# Patient Record
Sex: MALE | Race: WHITE | Employment: FULL TIME | URBAN - METROPOLITAN AREA
[De-identification: names, ages, dates, MRNs, and addresses within clinical notes are randomized per-mention and may not be internally consistent; named-entity substitution may affect disease eponyms.]

---

## 2019-02-17 ENCOUNTER — HOSPITAL ENCOUNTER (OUTPATIENT)
Age: 64
Discharge: LEFT BEFORE DISCHARGE | End: 2019-02-17
Payer: COMMERCIAL

## 2019-02-20 ENCOUNTER — OFFICE VISIT (OUTPATIENT)
Dept: FAMILY MEDICINE CLINIC | Facility: CLINIC | Age: 64
End: 2019-02-20
Payer: COMMERCIAL

## 2019-02-20 ENCOUNTER — HOSPITAL ENCOUNTER (OUTPATIENT)
Dept: GENERAL RADIOLOGY | Age: 64
Discharge: HOME | End: 2019-02-20
Attending: NURSE PRACTITIONER
Payer: COMMERCIAL

## 2019-02-20 VITALS
HEART RATE: 60 BPM | HEIGHT: 66 IN | DIASTOLIC BLOOD PRESSURE: 76 MMHG | WEIGHT: 130 LBS | BODY MASS INDEX: 20.89 KG/M2 | SYSTOLIC BLOOD PRESSURE: 148 MMHG

## 2019-02-20 DIAGNOSIS — M54.9 CHRONIC NECK AND BACK PAIN: Primary | ICD-10-CM

## 2019-02-20 DIAGNOSIS — M54.2 CHRONIC NECK AND BACK PAIN: ICD-10-CM

## 2019-02-20 DIAGNOSIS — G89.29 CHRONIC NECK AND BACK PAIN: Primary | ICD-10-CM

## 2019-02-20 DIAGNOSIS — G89.29 CHRONIC NECK AND BACK PAIN: ICD-10-CM

## 2019-02-20 DIAGNOSIS — M54.9 CHRONIC NECK AND BACK PAIN: ICD-10-CM

## 2019-02-20 DIAGNOSIS — M54.2 CHRONIC NECK AND BACK PAIN: Primary | ICD-10-CM

## 2019-02-20 DIAGNOSIS — Z72.0 TOBACCO USE: ICD-10-CM

## 2019-02-20 PROCEDURE — 72052 X-RAY EXAM NECK SPINE 6/>VWS: CPT | Performed by: NURSE PRACTITIONER

## 2019-02-20 PROCEDURE — 99204 OFFICE O/P NEW MOD 45 MIN: CPT | Performed by: NURSE PRACTITIONER

## 2019-02-20 PROCEDURE — 72114 X-RAY EXAM L-S SPINE BENDING: CPT | Performed by: NURSE PRACTITIONER

## 2019-02-20 RX ORDER — COVID-19 ANTIGEN TEST
220 KIT MISCELLANEOUS
COMMUNITY

## 2019-02-20 NOTE — PROGRESS NOTES
HPI  Pt here for evaluation of low back pain with sciatica. Has had low back issues for > 20 years. For past 2-3 weeks has had increase in pain, decreased mobility. No known injury to worsen pain-woke up in pain.  Thinks it may be related to uri that made Social History    Socioeconomic History      Marital status: Single      Spouse name: Not on file      Number of children: Not on file      Years of education: Not on file      Highest education level: Not on file    Occupational History      Not on file Cervical back: He exhibits bony tenderness. He exhibits normal range of motion and no tenderness. Lumbar back: He exhibits decreased range of motion, bony tenderness and spasm.         Back:    Neurological: He is alert and oriented to person, p

## 2019-02-21 PROBLEM — Z72.0 TOBACCO USE: Status: ACTIVE | Noted: 2019-02-21

## 2019-02-21 NOTE — ASSESSMENT & PLAN NOTE
cervical and lumbar spine xrays   con't otc ibuprofen 200 mg 2-3 tabs q 6-8 hrs prn  Ice to low back and neck 20 min 4-6 times per day  Refer physiatry  Additional referrals pending xray results.

## 2019-03-19 ENCOUNTER — OFFICE VISIT (OUTPATIENT)
Dept: NEUROLOGY | Facility: CLINIC | Age: 64
End: 2019-03-19
Payer: COMMERCIAL

## 2019-03-19 ENCOUNTER — TELEPHONE (OUTPATIENT)
Dept: FAMILY MEDICINE CLINIC | Facility: CLINIC | Age: 64
End: 2019-03-19

## 2019-03-19 ENCOUNTER — TELEPHONE (OUTPATIENT)
Dept: NEUROLOGY | Facility: CLINIC | Age: 64
End: 2019-03-19

## 2019-03-19 ENCOUNTER — HOSPITAL ENCOUNTER (OUTPATIENT)
Dept: GENERAL RADIOLOGY | Age: 64
Discharge: HOME | End: 2019-03-19
Attending: PHYSICAL MEDICINE & REHABILITATION
Payer: COMMERCIAL

## 2019-03-19 VITALS — RESPIRATION RATE: 16 BRPM | DIASTOLIC BLOOD PRESSURE: 82 MMHG | HEART RATE: 72 BPM | SYSTOLIC BLOOD PRESSURE: 146 MMHG

## 2019-03-19 DIAGNOSIS — M54.16 LUMBAR RADICULOPATHY: Primary | ICD-10-CM

## 2019-03-19 DIAGNOSIS — G89.29 CHRONIC BILATERAL LOW BACK PAIN WITH LEFT-SIDED SCIATICA: ICD-10-CM

## 2019-03-19 DIAGNOSIS — M43.17 SPONDYLOLISTHESIS AT L5-S1 LEVEL: ICD-10-CM

## 2019-03-19 DIAGNOSIS — M43.07 SPONDYLOLYSIS OF LUMBOSACRAL REGION: ICD-10-CM

## 2019-03-19 DIAGNOSIS — M54.42 CHRONIC BILATERAL LOW BACK PAIN WITH LEFT-SIDED SCIATICA: ICD-10-CM

## 2019-03-19 PROCEDURE — 99244 OFF/OP CNSLTJ NEW/EST MOD 40: CPT | Performed by: PHYSICAL MEDICINE & REHABILITATION

## 2019-03-19 PROCEDURE — 72110 X-RAY EXAM L-2 SPINE 4/>VWS: CPT | Performed by: PHYSICAL MEDICINE & REHABILITATION

## 2019-03-19 NOTE — TELEPHONE ENCOUNTER
Yissel for authorization of approval for MRI L-spine wo cpt code 10472. Tracking # [id number]  Will call [facility] Help to add [facility] as facility. [address] [facility].  T/t Padmini TINEO who transferred call to [place] [address] for facility

## 2019-03-19 NOTE — PATIENT INSTRUCTIONS
1) Get X-rays today on your way out  2) My office will call you when the MRI is approved by insurance. Then you will call to schedule the MRI.  Once you have a date for the MRI, call my office ([phone]) to schedule an appointment with me 2-3 days after

## 2019-03-19 NOTE — TELEPHONE ENCOUNTER
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  Patient is here today for a 1pm appointment with Nasim Denson, was wondering if letter could be ready on his way out, please advise

## 2019-03-19 NOTE — H&P
[address] H&P    Requesting Physician: No primary care provider on file.     Chief Complaint (Reason for Visit):  Patient presents with:  Neck Pain: Pt states that he has pain in the neck and its juancarlos Systems   Constitutional: Negative. HENT: Negative. Eyes: Negative. Respiratory: Negative. Cardiovascular: Negative. Gastrointestinal: Negative. Genitourinary: Negative. Musculoskeletal: As per HPI   Skin: Negative.     Neurological: As the lower extremities symmetrically  Reflexes: 2/4 at L4 and S1 except for 1 out of 4 at left L4 and S1  Facet Loading: Pain during extension and rotation to the right over the right low back.   Decreased pain during extension and rotation to the left over Dictated by (CST): Naheed Varma MD on 3/19/2019 at 15:45       Approved by (CST):  Naheed Varma MD on 3/19/2019 at 15:49              ASSESSMENT AND PLAN:  The patient is a 29-year-old male who is coming in complaining of low back pain which rad

## 2019-03-19 NOTE — H&P
[address] H&P    Requesting Physician: No primary care provider on file.     Chief Complaint (Reason for Visit):  Patient presents with:  Neck Pain: Pt states that he has pain in the neck and its juancarlos distress  Head: Normocephalic/ Atraumatic  Eyes: Extra-occular movements intact. Ears: No auricular hematoma or deformities  Mouth: No lesions or ulcerations  Heart: peripheral pulses intact. Normal capillary refill.    Lungs: Non-labored respirations  Ab {Imaging Body Part:6026} from ***  XR LUMBAR SPINE COMPLETE W/FLEX + EXT (CPT=72114)  Narrative: PROCEDURE: XR LUMBAR SPINE COMPLETE W/FLEX + EXT (CPT=72114)     COMPARISON: None. INDICATIONS: Chronic lower back pain with limited range of motion.      Delores Kelly TECHNIQUE: Cervical spine radiographs (7 views)     Counting reference:  Craniocervical junction. FINDINGS:      ALIGNMENT: Straightening of the normal cervical lordosis. ATLANTOAXIAL: Normal odontoid and atlantoaxial joints.     VERTEBRAL BODIES: [place]

## 2019-03-20 ENCOUNTER — TELEPHONE (OUTPATIENT)
Dept: NEUROLOGY | Facility: CLINIC | Age: 64
End: 2019-03-20

## 2019-03-20 NOTE — TELEPHONE ENCOUNTER
Pt calling to let Dr. Trudy Griffin know that while he was taking his x-rays yesterday, his shoulder came out of place while the imaging was being taken and he left there in more pain then when he arrived, he rated his pain at about a 8/9

## 2019-03-21 NOTE — TELEPHONE ENCOUNTER
S/w pt stating when they put the pt in position for XR, his bone in L5 popped in/out of place, causing increased pain. Pain was 405/10, is now 7-9/10. Pt states \"it's the fourth picture that shoes the injury\".      Pt plans to schedule f/u when we call hi

## 2019-03-22 NOTE — TELEPHONE ENCOUNTER
S/w patient and relayed XR results per Dr. Mike Tsang in previous note. Pt states he will move forward w/ scheduling the MRI and will call our office to schedule a f/u after he has that scheduled per Dr. Swathi Lee direction.

## 2019-03-22 NOTE — TELEPHONE ENCOUNTER
Attempted to call pt, unable to leave .  Pt requests return calls after 4pm. Pt is to be informed of Dr. Tamela Chase response below (Dr. Philomena Rutledge attempted to call pt with message below as well):    His XR demonstrate L4-S1 disc degeneration, osteoarthritis, and

## 2019-03-22 NOTE — TELEPHONE ENCOUNTER
[employer] to check on status of authorization for mri l-spine. Joan Islas Approved with Authorization # [id number] effective 03/20/19 to 04/19/19. Will call Pt. To inform. Maggie informed of approval. He will call to schedule appt.

## 2019-04-03 ENCOUNTER — HOSPITAL ENCOUNTER (OUTPATIENT)
Dept: MRI IMAGING | Age: 64
Discharge: HOME | End: 2019-04-03
Attending: PHYSICAL MEDICINE & REHABILITATION
Payer: COMMERCIAL

## 2019-04-03 DIAGNOSIS — M54.16 LUMBAR RADICULOPATHY: ICD-10-CM

## 2019-04-03 PROCEDURE — 72148 MRI LUMBAR SPINE W/O DYE: CPT | Performed by: PHYSICAL MEDICINE & REHABILITATION

## 2019-04-09 ENCOUNTER — TELEPHONE (OUTPATIENT)
Dept: FAMILY MEDICINE CLINIC | Facility: CLINIC | Age: 64
End: 2019-04-09

## 2019-04-09 DIAGNOSIS — M54.2 CHRONIC NECK AND BACK PAIN: Primary | ICD-10-CM

## 2019-04-09 DIAGNOSIS — M54.9 CHRONIC NECK AND BACK PAIN: Primary | ICD-10-CM

## 2019-04-09 DIAGNOSIS — G89.29 CHRONIC NECK AND BACK PAIN: Primary | ICD-10-CM

## 2019-04-09 NOTE — TELEPHONE ENCOUNTER
Patient requesting a referral to see dr Dalton Bryan for follow up office visit as soon as possible to go over mri. Please sign off on referral.    Informed patient referral process 3-5 business days.     Thanks,    Mustapha Covarrubias

## 2019-07-16 ENCOUNTER — OFFICE VISIT (OUTPATIENT)
Dept: NEUROLOGY | Facility: CLINIC | Age: 64
End: 2019-07-16
Payer: COMMERCIAL

## 2019-07-16 VITALS — SYSTOLIC BLOOD PRESSURE: 140 MMHG | RESPIRATION RATE: 17 BRPM | DIASTOLIC BLOOD PRESSURE: 72 MMHG | HEART RATE: 76 BPM

## 2019-07-16 DIAGNOSIS — M54.41 CHRONIC BILATERAL LOW BACK PAIN WITH BILATERAL SCIATICA: ICD-10-CM

## 2019-07-16 DIAGNOSIS — G89.29 CHRONIC BILATERAL LOW BACK PAIN WITH BILATERAL SCIATICA: ICD-10-CM

## 2019-07-16 DIAGNOSIS — M54.42 CHRONIC BILATERAL LOW BACK PAIN WITH BILATERAL SCIATICA: ICD-10-CM

## 2019-07-16 DIAGNOSIS — M51.26 BULGE OF LUMBAR DISC WITHOUT MYELOPATHY: ICD-10-CM

## 2019-07-16 DIAGNOSIS — M48.061 LUMBAR FORAMINAL STENOSIS: Primary | ICD-10-CM

## 2019-07-16 DIAGNOSIS — M47.899 FACET SYNDROME: ICD-10-CM

## 2019-07-16 DIAGNOSIS — M54.16 LUMBAR RADICULOPATHY: ICD-10-CM

## 2019-07-16 PROBLEM — M51.369 BULGE OF LUMBAR DISC WITHOUT MYELOPATHY: Status: ACTIVE | Noted: 2019-07-16

## 2019-07-16 PROBLEM — M51.36 BULGE OF LUMBAR DISC WITHOUT MYELOPATHY: Status: ACTIVE | Noted: 2019-07-16

## 2019-07-16 PROCEDURE — 99215 OFFICE O/P EST HI 40 MIN: CPT | Performed by: PHYSICAL MEDICINE & REHABILITATION

## 2019-07-16 RX ORDER — NAPROXEN 500 MG/1
500 TABLET ORAL 2 TIMES DAILY WITH MEALS
Qty: 60 TABLET | Refills: 0 | Status: SHIPPED | OUTPATIENT
Start: 2019-07-16

## 2019-07-16 NOTE — PROGRESS NOTES
[address]  Progress Note    CHIEF COMPLAINT:  Patient presents with:  Low Back Pain: LOV 03/19/19 Pt is present for a MRI follow up. he states that the pain changes all the time       History of Present Ill Naproxen Sodium (ALEVE) 220 MG Oral Cap Take 220 mg by mouth. Disp:  Rfl:        ALLERGIES:   No Known Allergies    REVIEW OF SYSTEMS:   Review of Systems   Constitutional: Negative. HENT: Negative. Eyes: Negative. Respiratory: Negative.     Card LEFT, great toe extension (L5) on the RIGHT, and plantar flexion (S1) on the left.   Sensation: Intact to light touch in all dermatomes of the lower extremities symmetrically  Reflexes: 2/4 at L4 and S1 except for 1 out of 4 at left L4 and S1  Facet Loading DISC LEVELS:  L1-L2: Disk desiccation with bulging disk, facet, and ligamentous hypertrophy results in mild central canal narrowing and mild bilateral neural foraminal narrowing.   L2-L3: Disk desiccation with bulging disk, facet, and ligamentous hypertroph although he is not sure which 1 of these he had in the early 1980s.   He states he has never had a caudal epidural injection and feels that that might be beneficial.  It is clinically indicated as the patient has bilateral moderate foraminal stenosis as wel

## 2019-07-16 NOTE — PATIENT INSTRUCTIONS
1) My office will call you to schedule the caudal epidural steroid injection under local anesthesia once the procedure is approved by your insurance carrier.     2) My office will call you 2 days after the procedure  3) Follow up with me 2 weeks after the i

## 2019-08-01 ENCOUNTER — TELEPHONE (OUTPATIENT)
Dept: NEUROLOGY | Facility: CLINIC | Age: 64
End: 2019-08-01

## 2019-08-01 NOTE — TELEPHONE ENCOUNTER
[facility] for authorization of approval for Caudal epidural steroid injection cpt code 62335. Approved with   Authorization # [id number] for one unit/DOS effective 0802/19 to 09/16/19. Will inform Nursing.

## 2019-08-12 ENCOUNTER — TELEPHONE (OUTPATIENT)
Dept: NEUROLOGY | Facility: CLINIC | Age: 64
End: 2019-08-12

## 2019-08-13 NOTE — TELEPHONE ENCOUNTER
Spoke to patient, states he has L5-S1 that pops out causing severe pain to back radiating down leg for about 30 seconds. This has been happening intermittently for last 20 years. C/o pain being so severe caused head and neck pain for 30 seconds also.  Occur

## 2019-08-13 NOTE — TELEPHONE ENCOUNTER
I agree, symptoms are chronic and do not seem emergent. The scheduled DAVID is reasonable. ER if needed is appropriate as we we have a non-emergent treatment plan in place.      If he would like the injection done sooner we can offer to put him on my schedule

## 2019-08-19 ENCOUNTER — HOSPITAL ENCOUNTER (EMERGENCY)
Facility: HOSPITAL | Age: 64
Discharge: HOME | End: 2019-08-19
Attending: EMERGENCY MEDICINE
Payer: COMMERCIAL

## 2019-08-19 VITALS
OXYGEN SATURATION: 98 % | BODY MASS INDEX: 18.05 KG/M2 | TEMPERATURE: 98 F | DIASTOLIC BLOOD PRESSURE: 121 MMHG | HEART RATE: 80 BPM | WEIGHT: 115 LBS | SYSTOLIC BLOOD PRESSURE: 147 MMHG | HEIGHT: 67 IN | RESPIRATION RATE: 17 BRPM

## 2019-08-19 DIAGNOSIS — M54.50 ACUTE EXACERBATION OF CHRONIC LOW BACK PAIN: Primary | ICD-10-CM

## 2019-08-19 DIAGNOSIS — G89.29 ACUTE EXACERBATION OF CHRONIC LOW BACK PAIN: Primary | ICD-10-CM

## 2019-08-19 PROCEDURE — 99283 EMERGENCY DEPT VISIT LOW MDM: CPT

## 2019-08-19 RX ORDER — METHYLPREDNISOLONE 4 MG/1
TABLET ORAL
Qty: 1 PACKAGE | Refills: 0 | Status: SHIPPED | OUTPATIENT
Start: 2019-08-19

## 2019-08-19 RX ORDER — CYCLOBENZAPRINE HCL 10 MG
10 TABLET ORAL 3 TIMES DAILY PRN
Qty: 20 TABLET | Refills: 0 | Status: SHIPPED | OUTPATIENT
Start: 2019-08-19 | End: 2019-08-22

## 2019-08-19 NOTE — TELEPHONE ENCOUNTER
Spoke with patient, states he cannot move his injection up due to his work schedule, states his vacation/pto does not start until 8/22/19, would like to keep his injection w/Dr.Behar 8/30/19 as scheduled.

## 2019-08-19 NOTE — ED PROVIDER NOTES
Patient Seen in: [facility] AND [facility] Emergency Department    History   Patient presents with:  Back Pain (musculoskeletal)    Stated Complaint: lower back pain    HPI    68-year-old male presents for complaint of exacerbation of his chronic back pain.   Arabella Montoya Constitutional: He is oriented to person, place, and time. He appears well-developed and well-nourished. HENT:   Head: Normocephalic and atraumatic. Eyes: Pupils are equal, round, and reactive to light.  Conjunctivae and EOM are normal.   Neck: Normal were addressed and answered.                         Disposition and Plan     Clinical Impression:  Acute exacerbation of chronic low back pain  (primary encounter diagnosis)    Disposition:  Discharge  8/19/2019  9:20 am    Follow-up:  Holly Chavarria MD  120

## 2019-08-22 ENCOUNTER — TELEPHONE (OUTPATIENT)
Dept: NEUROLOGY | Facility: CLINIC | Age: 64
End: 2019-08-22

## 2019-08-22 RX ORDER — CYCLOBENZAPRINE HCL 10 MG
10 TABLET ORAL 3 TIMES DAILY PRN
Qty: 60 TABLET | Refills: 1 | Status: SHIPPED | OUTPATIENT
Start: 2019-08-22

## 2019-08-22 NOTE — TELEPHONE ENCOUNTER
OK to hold medrol dose pack. OK to continue Tylenol. OK to continue NSAIDS. OK to continue Cyclobenzaprine. New script written for cyclobenzaprine 10 mg TID PRN for spasms    Thanks    Garo Tsang MD, [facility]  Physical Medicine and Rehabilitation/

## 2019-08-22 NOTE — TELEPHONE ENCOUNTER
Pt was called asking instructions for meds that need to be held before proceed. Pt asked about MEDROL pack before procedure. Pt told he probably needs to hold since he has not started and caudal injections is 08/30/19, has medrol rx but has not started.  Pt

## 2019-08-28 ENCOUNTER — TELEPHONE (OUTPATIENT)
Dept: NEUROLOGY | Facility: CLINIC | Age: 64
End: 2019-08-28

## 2019-08-28 NOTE — TELEPHONE ENCOUNTER
Spoke with pt stated that he has 1500 dollar deductible that he can't afford, cancelling 08/30/19 EOSC procedure. Pt rx steroid pack and muscle relaxer which he has not started. Pt stated he will try steroid orally.  Instructed to use, asked to call after co

## 2019-08-28 NOTE — TELEPHONE ENCOUNTER
* Pt called back he is fact canceling spoke with Soni Shoemaker at [facility] and informed them.  Will remove the appointment off the schedule for Friday    Spoke with [facility] and the Pt mentioned to them that his co payment for the procedure was to high and he may have to

## 2019-10-03 ENCOUNTER — TELEPHONE (OUTPATIENT)
Dept: NEUROLOGY | Facility: CLINIC | Age: 64
End: 2019-10-03

## 2019-10-03 NOTE — TELEPHONE ENCOUNTER
Pt called c/o of low back pain radiating to both legs worse on left. Left leg is giving out. Pt is having issues with balance. Pt is unable to afford steroid injection and has not done therapy.  Pt is stated that back is stiff had thigh in lower back and sh

## 2019-10-07 RX ORDER — GABAPENTIN 100 MG/1
100 CAPSULE ORAL 3 TIMES DAILY
Qty: 90 CAPSULE | Refills: 1 | Status: SHIPPED | OUTPATIENT
Start: 2019-10-07 | End: 2019-11-28

## 2019-10-07 NOTE — TELEPHONE ENCOUNTER
Pt is calling stating that he would like some pain medication. He states that he fell at work today and came home.

## 2019-10-07 NOTE — TELEPHONE ENCOUNTER
Called patient to discuss low back pain. Continue to have pain in the low back which radiates down both legs. He should begin taking Gabapentin 100-200 mg three times per day. OK to continue Naprosyn 500 mg twice per day with food.  I will be seeing Masoud Huffman

## 2019-11-06 ENCOUNTER — TELEPHONE (OUTPATIENT)
Dept: NEUROLOGY | Facility: CLINIC | Age: 64
End: 2019-11-06

## 2019-11-06 NOTE — TELEPHONE ENCOUNTER
Pt states that he's having spasms that's cause him to fall to the floor. He states that it has been going on for a couple of weeks. Pt states that he did go to the ER and they didn't do anything to help him.  Now the pain is on the right side of back, jude

## 2019-11-07 RX ORDER — NAPROXEN 500 MG/1
TABLET ORAL
Qty: 60 TABLET | Refills: 0 | OUTPATIENT
Start: 2019-11-07

## 2019-11-12 ENCOUNTER — OFFICE VISIT (OUTPATIENT)
Dept: NEUROLOGY | Facility: CLINIC | Age: 64
End: 2019-11-12
Payer: COMMERCIAL

## 2019-11-12 VITALS — BODY MASS INDEX: 18.05 KG/M2 | HEIGHT: 67 IN | WEIGHT: 115 LBS | RESPIRATION RATE: 18 BRPM

## 2019-11-12 DIAGNOSIS — M48.061 LUMBAR FORAMINAL STENOSIS: Primary | ICD-10-CM

## 2019-11-12 DIAGNOSIS — M51.26 BULGE OF LUMBAR DISC WITHOUT MYELOPATHY: ICD-10-CM

## 2019-11-12 DIAGNOSIS — G89.29 CHRONIC BILATERAL LOW BACK PAIN WITH BILATERAL SCIATICA: ICD-10-CM

## 2019-11-12 DIAGNOSIS — M47.899 FACET SYNDROME: ICD-10-CM

## 2019-11-12 DIAGNOSIS — M54.16 LUMBAR RADICULOPATHY: ICD-10-CM

## 2019-11-12 DIAGNOSIS — M54.41 CHRONIC BILATERAL LOW BACK PAIN WITH BILATERAL SCIATICA: ICD-10-CM

## 2019-11-12 DIAGNOSIS — M54.42 CHRONIC BILATERAL LOW BACK PAIN WITH BILATERAL SCIATICA: ICD-10-CM

## 2019-11-12 DIAGNOSIS — M51.16 LUMBAR DISC HERNIATION WITH RADICULOPATHY: ICD-10-CM

## 2019-11-12 DIAGNOSIS — M43.17 SPONDYLOLISTHESIS AT L5-S1 LEVEL: ICD-10-CM

## 2019-11-12 PROCEDURE — 99214 OFFICE O/P EST MOD 30 MIN: CPT | Performed by: PHYSICAL MEDICINE & REHABILITATION

## 2019-11-12 RX ORDER — DULOXETIN HYDROCHLORIDE 30 MG/1
30 CAPSULE, DELAYED RELEASE ORAL DAILY
Qty: 30 CAPSULE | Refills: 1 | Status: SHIPPED | OUTPATIENT
Start: 2019-11-12

## 2019-11-12 NOTE — PATIENT INSTRUCTIONS
1) Start taking Cymbalta 30 mg nightly  2) Keep taking gabapentin 100 mg three times per day  3) Stop taking the flexeril  4) Follow up with me in 1 month.

## 2019-11-13 PROBLEM — M51.16 LUMBAR DISC HERNIATION WITH RADICULOPATHY: Status: ACTIVE | Noted: 2019-11-13

## 2019-11-13 NOTE — PROGRESS NOTES
[address]  Progress Note    CHIEF COMPLAINT:  Patient presents with:  Low Back Pain: LOV 07/16/19 Pt states that hes here to follow up because nothing is working at this time , and its effecting his work directed 1 Package 0   • naproxen (NAPROSYN) 500 MG Oral Tab Take 1 tablet (500 mg total) by mouth 2 (two) times daily with meals. Take for 2 weeks as directed and then as needed.  60 tablet 0   • Naproxen Sodium (ALEVE) 220 MG Oral Cap Take 220 mg by mouth lumbar spine with hamstring tightness noted during forward flexion  Motor: 5 out of 5 in all myotomes of the bilateral lower extremity except for 4 out of 5 with dorsiflexion (L4) on the LEFT, great toe extension (L5) on the RIGHT, and plantar flexion (S1) lumbar vertebral bodies. There is no acute fracture, dislocation or marrow replacing lesion. CORD/CAUDA EQUINA: Normal caliber, contour, and signal intensity. OTHER: Negative.      LUMBAR DISC LEVELS:  L1-L2: Disk desiccation with bulging disk, facet, per day. He should stop the Flexeril and follow-up with me in 1 month. The patient is adamant that he does not want to perform any formal physical therapy. I reiterated to him multiple times that this is the most important part of his treatment plan.   H

## 2019-11-30 NOTE — TELEPHONE ENCOUNTER
Medication request: Gabapentin 100 mg, Take 1 capsule by mouth TID. Qt 90 Refills 0    LOV:11/12/19  NOV: 12/10/19    Last refill:10/7/19

## 2019-12-01 RX ORDER — GABAPENTIN 100 MG/1
100 CAPSULE ORAL 3 TIMES DAILY
Qty: 90 CAPSULE | Refills: 0 | Status: SHIPPED | OUTPATIENT
Start: 2019-12-01

## 2019-12-02 NOTE — TELEPHONE ENCOUNTER
Order signed. Jai Adorno.  Gabriella Aguirre MD, [address] [facility]  Physical Medicine and Rehabilitation/Sports Medicine  MEDICAL CENTER [facility]

## 2022-05-17 ENCOUNTER — LAB SERVICES (OUTPATIENT)
Dept: LAB | Age: 67
End: 2022-05-17

## 2022-05-17 ENCOUNTER — LAB REQUISITION (OUTPATIENT)
Dept: LAB | Age: 67
End: 2022-05-17

## 2022-05-17 DIAGNOSIS — Z00.00 ENCOUNTER FOR GENERAL ADULT MEDICAL EXAMINATION WITHOUT ABNORMAL FINDINGS: ICD-10-CM

## 2022-05-17 DIAGNOSIS — Z12.5 ENCOUNTER FOR SCREENING FOR MALIGNANT NEOPLASM OF PROSTATE: ICD-10-CM

## 2022-05-17 LAB
ALBUMIN SERPL-MCNC: 3.9 G/DL (ref 3.6–5.1)
ALBUMIN/GLOB SERPL: 1.4 {RATIO} (ref 1–2.4)
ALP SERPL-CCNC: 88 UNITS/L (ref 45–117)
ALT SERPL-CCNC: 33 UNITS/L
ANION GAP SERPL CALC-SCNC: 12 MMOL/L (ref 10–20)
AST SERPL-CCNC: 29 UNITS/L
BASOPHILS # BLD: 0 K/MCL (ref 0–0.3)
BASOPHILS NFR BLD: 1 %
BILIRUB SERPL-MCNC: 0.6 MG/DL (ref 0.2–1)
BUN SERPL-MCNC: 18 MG/DL (ref 6–20)
BUN/CREAT SERPL: 20 (ref 7–25)
CALCIUM SERPL-MCNC: 9.5 MG/DL (ref 8.4–10.2)
CHLORIDE SERPL-SCNC: 106 MMOL/L (ref 98–107)
CHOLEST SERPL-MCNC: 144 MG/DL
CHOLEST/HDLC SERPL: 2.9 {RATIO}
CO2 SERPL-SCNC: 25 MMOL/L (ref 21–32)
CREAT SERPL-MCNC: 0.91 MG/DL (ref 0.67–1.17)
DEPRECATED RDW RBC: 52.2 FL (ref 39–50)
EOSINOPHIL # BLD: 0 K/MCL (ref 0–0.5)
EOSINOPHIL NFR BLD: 1 %
ERYTHROCYTE [DISTWIDTH] IN BLOOD: 14.1 % (ref 11–15)
FASTING DURATION TIME PATIENT: NORMAL H
FASTING DURATION TIME PATIENT: NORMAL H
GFR SERPLBLD BASED ON 1.73 SQ M-ARVRAT: >90 ML/MIN
GLOBULIN SER-MCNC: 2.8 G/DL (ref 2–4)
GLUCOSE SERPL-MCNC: 90 MG/DL (ref 70–99)
HCT VFR BLD CALC: 42.3 % (ref 39–51)
HDLC SERPL-MCNC: 50 MG/DL
HGB BLD-MCNC: 14.5 G/DL (ref 13–17)
IMM GRANULOCYTES # BLD AUTO: 0 K/MCL (ref 0–0.2)
IMM GRANULOCYTES # BLD: 0 %
LDLC SERPL CALC-MCNC: 79 MG/DL
LYMPHOCYTES # BLD: 1.6 K/MCL (ref 1–4)
LYMPHOCYTES NFR BLD: 20 %
MCH RBC QN AUTO: 34.4 PG (ref 26–34)
MCHC RBC AUTO-ENTMCNC: 34.3 G/DL (ref 32–36.5)
MCV RBC AUTO: 100.5 FL (ref 78–100)
MONOCYTES # BLD: 0.8 K/MCL (ref 0.3–0.9)
MONOCYTES NFR BLD: 10 %
NEUTROPHILS # BLD: 5.8 K/MCL (ref 1.8–7.7)
NEUTROPHILS NFR BLD: 68 %
NONHDLC SERPL-MCNC: 94 MG/DL
NRBC BLD MANUAL-RTO: 0 /100 WBC
PLATELET # BLD AUTO: 232 K/MCL (ref 140–450)
POTASSIUM SERPL-SCNC: 5 MMOL/L (ref 3.4–5.1)
PROT SERPL-MCNC: 6.7 G/DL (ref 6.4–8.2)
PSA SERPL-MCNC: 6.84 NG/ML
RBC # BLD: 4.21 MIL/MCL (ref 4.5–5.9)
SODIUM SERPL-SCNC: 138 MMOL/L (ref 135–145)
TRIGL SERPL-MCNC: 76 MG/DL
TSH SERPL-ACNC: 1.83 MCUNITS/ML (ref 0.35–5)
WBC # BLD: 8.4 K/MCL (ref 4.2–11)

## 2022-05-17 PROCEDURE — 84153 ASSAY OF PSA TOTAL: CPT | Performed by: CLINICAL MEDICAL LABORATORY

## 2022-05-17 PROCEDURE — 80061 LIPID PANEL: CPT | Performed by: CLINICAL MEDICAL LABORATORY

## 2022-05-17 PROCEDURE — 36415 COLL VENOUS BLD VENIPUNCTURE: CPT | Performed by: CLINICAL MEDICAL LABORATORY

## 2022-05-17 PROCEDURE — 80050 GENERAL HEALTH PANEL: CPT | Performed by: CLINICAL MEDICAL LABORATORY

## 2022-10-12 NOTE — TELEPHONE ENCOUNTER
[facility] Grid use. Patient has been scheduled for a caudal epidural steroid injection  on 08/30/19 at the [facility]. Medications and allergies reviewed. Patient informed to hold aspirins, nsaids, blood thinners, vitamins and fish oils 5-7 days prior to procedure.  Patient told to

## 2023-06-09 ENCOUNTER — LAB ENCOUNTER (OUTPATIENT)
Dept: LAB | Age: 68
End: 2023-06-09
Attending: INTERNAL MEDICINE

## 2023-06-09 DIAGNOSIS — E78.5 HYPERLIPEMIA: Primary | ICD-10-CM

## 2023-06-09 DIAGNOSIS — R53.1 WEAKNESS GENERALIZED: ICD-10-CM

## 2023-06-09 LAB
ALBUMIN SERPL-MCNC: 3.4 G/DL (ref 3.4–5)
ALBUMIN/GLOB SERPL: 1.2 {RATIO} (ref 1–2)
ALP LIVER SERPL-CCNC: 109 U/L
ALT SERPL-CCNC: 29 U/L
ANION GAP SERPL CALC-SCNC: 3 MMOL/L (ref 0–18)
AST SERPL-CCNC: 19 U/L (ref 15–37)
BASOPHILS # BLD AUTO: 0.02 X10(3) UL (ref 0–0.2)
BASOPHILS NFR BLD AUTO: 0.3 %
BILIRUB SERPL-MCNC: 0.4 MG/DL (ref 0.1–2)
BUN BLD-MCNC: 11 MG/DL (ref 7–18)
BUN/CREAT SERPL: 12.4 (ref 10–20)
CALCIUM BLD-MCNC: 8.8 MG/DL (ref 8.5–10.1)
CHLORIDE SERPL-SCNC: 112 MMOL/L (ref 98–112)
CHOLEST SERPL-MCNC: 113 MG/DL (ref ?–200)
CO2 SERPL-SCNC: 26 MMOL/L (ref 21–32)
CREAT BLD-MCNC: 0.89 MG/DL
DEPRECATED RDW RBC AUTO: 57.8 FL (ref 35.1–46.3)
EOSINOPHIL # BLD AUTO: 0 X10(3) UL (ref 0–0.7)
EOSINOPHIL NFR BLD AUTO: 0 %
ERYTHROCYTE [DISTWIDTH] IN BLOOD BY AUTOMATED COUNT: 14.4 % (ref 11–15)
FASTING PATIENT LIPID ANSWER: YES
FASTING STATUS PATIENT QL REPORTED: YES
GFR SERPLBLD BASED ON 1.73 SQ M-ARVRAT: 95 ML/MIN/1.73M2 (ref 60–?)
GLOBULIN PLAS-MCNC: 2.8 G/DL (ref 2.8–4.4)
GLUCOSE BLD-MCNC: 96 MG/DL (ref 70–99)
HCT VFR BLD AUTO: 36.3 %
HDLC SERPL-MCNC: 30 MG/DL (ref 40–59)
HGB BLD-MCNC: 12.4 G/DL
IMM GRANULOCYTES # BLD AUTO: 0.01 X10(3) UL (ref 0–1)
IMM GRANULOCYTES NFR BLD: 0.1 %
LDLC SERPL CALC-MCNC: 71 MG/DL (ref ?–100)
LYMPHOCYTES # BLD AUTO: 1.71 X10(3) UL (ref 1–4)
LYMPHOCYTES NFR BLD AUTO: 22.2 %
MCH RBC QN AUTO: 37.2 PG (ref 26–34)
MCHC RBC AUTO-ENTMCNC: 34.2 G/DL (ref 31–37)
MCV RBC AUTO: 109 FL
MONOCYTES # BLD AUTO: 0.56 X10(3) UL (ref 0.1–1)
MONOCYTES NFR BLD AUTO: 7.3 %
NEUTROPHILS # BLD AUTO: 5.4 X10 (3) UL (ref 1.5–7.7)
NEUTROPHILS # BLD AUTO: 5.4 X10(3) UL (ref 1.5–7.7)
NEUTROPHILS NFR BLD AUTO: 70.1 %
NONHDLC SERPL-MCNC: 83 MG/DL (ref ?–130)
OSMOLALITY SERPL CALC.SUM OF ELEC: 291 MOSM/KG (ref 275–295)
PLATELET # BLD AUTO: 213 10(3)UL (ref 150–450)
POTASSIUM SERPL-SCNC: 4.1 MMOL/L (ref 3.5–5.1)
PROT SERPL-MCNC: 6.2 G/DL (ref 6.4–8.2)
RBC # BLD AUTO: 3.33 X10(6)UL
SODIUM SERPL-SCNC: 141 MMOL/L (ref 136–145)
TRIGL SERPL-MCNC: 50 MG/DL (ref 30–149)
TSI SER-ACNC: 2.39 MIU/ML (ref 0.36–3.74)
VLDLC SERPL CALC-MCNC: 8 MG/DL (ref 0–30)
WBC # BLD AUTO: 7.7 X10(3) UL (ref 4–11)

## 2023-06-09 PROCEDURE — 84443 ASSAY THYROID STIM HORMONE: CPT

## 2023-06-09 PROCEDURE — 36415 COLL VENOUS BLD VENIPUNCTURE: CPT

## 2023-06-09 PROCEDURE — 80053 COMPREHEN METABOLIC PANEL: CPT

## 2023-06-09 PROCEDURE — 85025 COMPLETE CBC W/AUTO DIFF WBC: CPT

## 2023-06-09 PROCEDURE — 80061 LIPID PANEL: CPT

## 2023-06-13 ENCOUNTER — LAB REQUISITION (OUTPATIENT)
Dept: LAB | Age: 68
End: 2023-06-13

## 2023-06-13 ENCOUNTER — LAB SERVICES (OUTPATIENT)
Dept: LAB | Age: 68
End: 2023-06-13

## 2023-06-13 DIAGNOSIS — E55.9 VITAMIN D DEFICIENCY, UNSPECIFIED: ICD-10-CM

## 2023-06-13 DIAGNOSIS — D63.8 ANEMIA IN OTHER CHRONIC DISEASES CLASSIFIED ELSEWHERE: ICD-10-CM

## 2023-06-13 DIAGNOSIS — R35.0 FREQUENCY OF MICTURITION: ICD-10-CM

## 2023-06-13 DIAGNOSIS — Z12.11 ENCOUNTER FOR SCREENING FOR MALIGNANT NEOPLASM OF COLON: ICD-10-CM

## 2023-06-13 DIAGNOSIS — Z12.5 ENCOUNTER FOR SCREENING FOR MALIGNANT NEOPLASM OF PROSTATE: ICD-10-CM

## 2023-06-13 LAB
25(OH)D3+25(OH)D2 SERPL-MCNC: 11 NG/ML (ref 30–100)
APPEARANCE UR: ABNORMAL
BILIRUB UR QL STRIP: NEGATIVE
COLOR UR: YELLOW
FOLATE SERPL-MCNC: 4.6 NG/ML
GLUCOSE UR STRIP-MCNC: NEGATIVE MG/DL
HGB UR QL STRIP: NEGATIVE
KETONES UR STRIP-MCNC: NEGATIVE MG/DL
LEUKOCYTE ESTERASE UR QL STRIP: NEGATIVE
NITRITE UR QL STRIP: NEGATIVE
PH UR STRIP: 6 [PH] (ref 5–7)
PROT UR STRIP-MCNC: ABNORMAL MG/DL
PSA SERPL-MCNC: 4.41 NG/ML
SP GR UR STRIP: 1.02 (ref 1–1.03)
UROBILINOGEN UR STRIP-MCNC: 0.2 MG/DL
VIT B12 SERPL-MCNC: 156 PG/ML (ref 211–911)

## 2023-06-13 PROCEDURE — 82607 VITAMIN B-12: CPT | Performed by: CLINICAL MEDICAL LABORATORY

## 2023-06-13 PROCEDURE — 82746 ASSAY OF FOLIC ACID SERUM: CPT | Performed by: CLINICAL MEDICAL LABORATORY

## 2023-06-13 PROCEDURE — 36415 COLL VENOUS BLD VENIPUNCTURE: CPT | Performed by: CLINICAL MEDICAL LABORATORY

## 2023-06-13 PROCEDURE — 82306 VITAMIN D 25 HYDROXY: CPT | Performed by: CLINICAL MEDICAL LABORATORY

## 2023-06-13 PROCEDURE — 81003 URINALYSIS AUTO W/O SCOPE: CPT | Performed by: CLINICAL MEDICAL LABORATORY

## 2023-06-13 PROCEDURE — 84153 ASSAY OF PSA TOTAL: CPT | Performed by: CLINICAL MEDICAL LABORATORY

## 2023-06-28 PROCEDURE — 82274 ASSAY TEST FOR BLOOD FECAL: CPT | Performed by: CLINICAL MEDICAL LABORATORY

## 2023-07-03 LAB — HEMOCCULT STL QL: NEGATIVE

## 2025-03-10 ENCOUNTER — APPOINTMENT (OUTPATIENT)
Dept: CT IMAGING | Facility: HOSPITAL | Age: 70
End: 2025-03-10
Attending: NURSE PRACTITIONER
Payer: MEDICARE

## 2025-03-10 ENCOUNTER — HOSPITAL ENCOUNTER (EMERGENCY)
Facility: HOSPITAL | Age: 70
Discharge: HOME | End: 2025-03-10
Payer: MEDICARE

## 2025-03-10 VITALS
OXYGEN SATURATION: 98 % | HEIGHT: 67 IN | TEMPERATURE: 97 F | DIASTOLIC BLOOD PRESSURE: 63 MMHG | BODY MASS INDEX: 18.36 KG/M2 | HEART RATE: 53 BPM | WEIGHT: 117 LBS | SYSTOLIC BLOOD PRESSURE: 145 MMHG | RESPIRATION RATE: 18 BRPM

## 2025-03-10 DIAGNOSIS — M54.16 LUMBAR RADICULOPATHY: Primary | ICD-10-CM

## 2025-03-10 LAB
ANION GAP SERPL CALC-SCNC: 4 MMOL/L (ref 0–18)
ATRIAL RATE: 54 BPM
BASOPHILS # BLD AUTO: 0.02 X10(3) UL (ref 0–0.2)
BASOPHILS NFR BLD AUTO: 0.2 %
BUN BLD-MCNC: 22 MG/DL (ref 9–23)
BUN/CREAT SERPL: 22.4 (ref 10–20)
CALCIUM BLD-MCNC: 8.6 MG/DL (ref 8.7–10.4)
CHLORIDE SERPL-SCNC: 116 MMOL/L (ref 98–112)
CO2 SERPL-SCNC: 18 MMOL/L (ref 21–32)
CREAT BLD-MCNC: 0.98 MG/DL
DEPRECATED RDW RBC AUTO: 51 FL (ref 35.1–46.3)
EGFRCR SERPLBLD CKD-EPI 2021: 85 ML/MIN/1.73M2 (ref 60–?)
EOSINOPHIL # BLD AUTO: 0 X10(3) UL (ref 0–0.7)
EOSINOPHIL NFR BLD AUTO: 0 %
ERYTHROCYTE [DISTWIDTH] IN BLOOD BY AUTOMATED COUNT: 14.4 % (ref 11–15)
GLUCOSE BLD-MCNC: 91 MG/DL (ref 70–99)
HCT VFR BLD AUTO: 39.8 %
HGB BLD-MCNC: 13.2 G/DL
IMM GRANULOCYTES # BLD AUTO: 0.02 X10(3) UL (ref 0–1)
IMM GRANULOCYTES NFR BLD: 0.2 %
LYMPHOCYTES # BLD AUTO: 1.33 X10(3) UL (ref 1–4)
LYMPHOCYTES NFR BLD AUTO: 15.1 %
MCH RBC QN AUTO: 32.4 PG (ref 26–34)
MCHC RBC AUTO-ENTMCNC: 33.2 G/DL (ref 31–37)
MCV RBC AUTO: 97.8 FL
MONOCYTES # BLD AUTO: 0.9 X10(3) UL (ref 0.1–1)
MONOCYTES NFR BLD AUTO: 10.2 %
NEUTROPHILS # BLD AUTO: 6.54 X10 (3) UL (ref 1.5–7.7)
NEUTROPHILS # BLD AUTO: 6.54 X10(3) UL (ref 1.5–7.7)
NEUTROPHILS NFR BLD AUTO: 74.3 %
OSMOLALITY SERPL CALC.SUM OF ELEC: 289 MOSM/KG (ref 275–295)
P AXIS: 75 DEGREES
P-R INTERVAL: 132 MS
PLATELET # BLD AUTO: 260 10(3)UL (ref 150–450)
POTASSIUM SERPL-SCNC: 4.9 MMOL/L (ref 3.5–5.1)
Q-T INTERVAL: 436 MS
QRS DURATION: 88 MS
QTC CALCULATION (BEZET): 413 MS
R AXIS: 58 DEGREES
RBC # BLD AUTO: 4.07 X10(6)UL
SODIUM SERPL-SCNC: 138 MMOL/L (ref 136–145)
T AXIS: 65 DEGREES
TROPONIN I SERPL HS-MCNC: 13 NG/L
VENTRICULAR RATE: 54 BPM
WBC # BLD AUTO: 8.8 X10(3) UL (ref 4–11)

## 2025-03-10 PROCEDURE — 99284 EMERGENCY DEPT VISIT MOD MDM: CPT

## 2025-03-10 PROCEDURE — 93005 ELECTROCARDIOGRAM TRACING: CPT

## 2025-03-10 PROCEDURE — 36415 COLL VENOUS BLD VENIPUNCTURE: CPT

## 2025-03-10 PROCEDURE — 80048 BASIC METABOLIC PNL TOTAL CA: CPT | Performed by: NURSE PRACTITIONER

## 2025-03-10 PROCEDURE — 72131 CT LUMBAR SPINE W/O DYE: CPT | Performed by: NURSE PRACTITIONER

## 2025-03-10 PROCEDURE — 85025 COMPLETE CBC W/AUTO DIFF WBC: CPT | Performed by: NURSE PRACTITIONER

## 2025-03-10 PROCEDURE — 93010 ELECTROCARDIOGRAM REPORT: CPT

## 2025-03-10 PROCEDURE — 84484 ASSAY OF TROPONIN QUANT: CPT | Performed by: NURSE PRACTITIONER

## 2025-03-10 RX ORDER — HYDROCODONE BITARTRATE AND ACETAMINOPHEN 5; 325 MG/1; MG/1
1 TABLET ORAL ONCE
Status: COMPLETED | OUTPATIENT
Start: 2025-03-10 | End: 2025-03-10

## 2025-03-10 RX ORDER — HYDROCODONE BITARTRATE AND ACETAMINOPHEN 7.5; 325 MG/1; MG/1
1-2 TABLET ORAL EVERY 6 HOURS PRN
Qty: 10 TABLET | Refills: 0 | Status: SHIPPED | OUTPATIENT
Start: 2025-03-10 | End: 2025-03-15

## 2025-03-10 RX ORDER — METHYLPREDNISOLONE 4 MG/1
TABLET ORAL
Qty: 1 EACH | Refills: 0 | Status: SHIPPED | OUTPATIENT
Start: 2025-03-10

## 2025-03-10 NOTE — ED INITIAL ASSESSMENT (HPI)
Pt in wheel chair through triage c/o lower leg pain for the last 6 months increasing recently. Pt denies any bowel or bladder control issues. Pt states he is weak at work and unable to stand for long periods

## 2025-03-10 NOTE — ED INITIAL ASSESSMENT (HPI)
Pt states he sometimes feels as though he doesn't want to go on d/t pain and the way it impacts his life, but states \"I'm not going to commit suicide or anything\"

## 2025-03-10 NOTE — ED QUICK NOTES
Patient safe to discharge home per ED Provider. Discharge education provided, including follow up instructions. IV removed by this RN. Patient verbalizes understanding.

## 2025-03-10 NOTE — ED PROVIDER NOTES
Patient Seen in: [facility] Emergency Department      History     Chief Complaint   Patient presents with    Back Pain    Fatigue     Stated Complaint: Back and leg pain    Subjective:   68yo/m w hx of sciatica reports w 6 months of right lower back pain radiating down back. Increasing this week. Remote hx of injections 3-4 years ago after his last MRI. No loss or retention of bowel/bladder. No fever. No falls. Reports he is unable to walk a block without pain radiating. No pallor. No numbness. No skin changes.               Objective:     History reviewed. No pertinent past medical history.           History reviewed. No pertinent surgical history.             Social History     Socioeconomic History    Marital status: Single   Tobacco Use    Smoking status: Every Day    Smokeless tobacco: Never   Vaping Use    Vaping status: Never Used   Substance and Sexual Activity    Alcohol use: Yes     Comment: rarely     Drug use: No     Social Drivers of Health      Received from [facility]                  Physical Exam     ED Triage Vitals [03/10/25 1111]   /67   Pulse 76   Resp 18   Temp 97 °F (36.1 °C)   Temp src Temporal   SpO2 98 %   O2 Device None (Room air)       Current Vitals:   Vital Signs  BP: 145/63  Pulse: 53  Resp: 18  Temp: 97 °F (36.1 °C)  Temp src: Temporal  MAP (mmHg): 87    Oxygen Therapy  SpO2: 98 %  O2 Device: None (Room air)        Physical Exam  Vitals and nursing note reviewed.   Constitutional:       General: He is not in acute distress.     Appearance: He is well-developed.   HENT:      Head: Normocephalic and atraumatic.      Nose: Nose normal.      Mouth/Throat:      Mouth: Mucous membranes are moist.   Eyes:      Conjunctiva/sclera: Conjunctivae normal.      Pupils: Pupils are equal, round, and reactive to light.   Cardiovascular:      Rate and Rhythm: Normal rate and regular rhythm.      Heart sounds: Normal heart sounds.   Pulmonary:      Effort: Pulmonary  effort is normal.      Breath sounds: Normal breath sounds.   Abdominal:      General: Bowel sounds are normal.      Palpations: Abdomen is soft.   Musculoskeletal:         General: No tenderness or deformity. Normal range of motion.      Cervical back: Normal range of motion and neck supple.   Skin:     General: Skin is warm and dry.      Capillary Refill: Capillary refill takes less than 2 seconds.      Findings: No rash.      Comments: Normal color   Neurological:      General: No focal deficit present.      Mental Status: He is alert and oriented to person, place, and time.      GCS: GCS eye subscore is 4. GCS verbal subscore is 5. GCS motor subscore is 6.      Cranial Nerves: No cranial nerve deficit.      Sensory: No sensory deficit.      Coordination: Coordination normal.      Gait: Gait normal.      Deep Tendon Reflexes: Reflexes normal.             ED Course     Labs Reviewed   CBC WITH DIFFERENTIAL WITH PLATELET - Abnormal; Notable for the following components:       Result Value    RDW-SD 51.0 (*)     All other components within normal limits   BASIC METABOLIC PANEL (8) - Abnormal; Notable for the following components:    Chloride 116 (*)     CO2 18.0 (*)     BUN/CREA Ratio 22.4 (*)     Calcium, Total 8.6 (*)     All other components within normal limits   TROPONIN I HIGH SENSITIVITY - Normal     EKG    Rate, intervals and axes as noted on EKG Report.  Rate: 54   Rhythm: Sinus Rhythm  Reading: SB no cr no ectopy normal axis  Stable clinical condition  No comparison                Impression  CONCLUSION:     Multilevel lumbar spine degenerative change as detailed above overall progressed since the prior MRI April 2019. The most significant level represents L3-L4 where there is moderate central canal narrowing, moderate left foraminal narrowing and mild right   foraminal narrowing.     Subcentimeter nonobstructing left renal calculus.     Partially imaged borderline circumferential urinary bladder wall  thickening.  If there is clinical concern for cystitis, correlation with urinalysis advised.                Dictated by (CST): Emiliano Lopez MD on 3/10/2025 at 1:18 PM             MDM              Medical Decision Making  70yo/m w hx and exam as stated; back pain    No weakness  No focal deficits  No chest pain  No loss or retention of bowel/bladder  No surgery, last injection 5 years ago  Strong distal pulses, warm and non edematous extremities    Plan  Dc  Pain control  Close fu with Dr. Behar      Amount and/or Complexity of Data Reviewed  Labs:  Decision-making details documented in ED Course.  Radiology:  Decision-making details documented in ED Course.    Risk  OTC drugs.  Prescription drug management.        Disposition and Plan     Clinical Impression:  1. Lumbar radiculopathy         Disposition:  Discharge  3/10/2025  1:52 pm    Follow-up:  Behar, Alex, MD  [address] [place] [address] [id number]  [facility] [id number] [phone]    Call today      We recommend that you schedule follow up care with a primary care provider within the next three months to obtain basic health screening including reassessment of your blood pressure.      Medications Prescribed:  Current Discharge Medication List        START taking these medications    Details   HYDROcodone-acetaminophen 7.5-325 MG Oral Tab Take 1-2 tablets by mouth every 6 (six) hours as needed for Pain.  Qty: 10 tablet, Refills: 0    Associated Diagnoses: Lumbar radiculopathy      !! methylPREDNISolone (MEDROL) 4 MG Oral Tablet Therapy Pack Dosepack: take as directed  Qty: 1 each, Refills: 0    Associated Diagnoses: Lumbar radiculopathy       !! - Potential duplicate medications found. Please discuss with provider.              Supplementary Documentation:

## 2025-03-21 ENCOUNTER — TELEPHONE (OUTPATIENT)
Dept: PHYSICAL MEDICINE AND REHAB | Facility: CLINIC | Age: 70
End: 2025-03-21

## 2025-03-21 ENCOUNTER — HOSPITAL ENCOUNTER (EMERGENCY)
Facility: HOSPITAL | Age: 70
Discharge: HOME | End: 2025-03-21
Attending: EMERGENCY MEDICINE
Payer: MEDICARE

## 2025-03-21 VITALS
RESPIRATION RATE: 17 BRPM | TEMPERATURE: 98 F | WEIGHT: 110 LBS | BODY MASS INDEX: 17.27 KG/M2 | HEART RATE: 56 BPM | DIASTOLIC BLOOD PRESSURE: 60 MMHG | OXYGEN SATURATION: 96 % | SYSTOLIC BLOOD PRESSURE: 113 MMHG | HEIGHT: 67 IN

## 2025-03-21 DIAGNOSIS — M54.50 LOW BACK PAIN, UNSPECIFIED BACK PAIN LATERALITY, UNSPECIFIED CHRONICITY, UNSPECIFIED WHETHER SCIATICA PRESENT: Primary | ICD-10-CM

## 2025-03-21 LAB
ATRIAL RATE: 81 BPM
P AXIS: 69 DEGREES
P-R INTERVAL: 120 MS
Q-T INTERVAL: 374 MS
QRS DURATION: 80 MS
QTC CALCULATION (BEZET): 434 MS
R AXIS: 32 DEGREES
T AXIS: 258 DEGREES
VENTRICULAR RATE: 81 BPM

## 2025-03-21 PROCEDURE — 96372 THER/PROPH/DIAG INJ SC/IM: CPT

## 2025-03-21 PROCEDURE — 99284 EMERGENCY DEPT VISIT MOD MDM: CPT

## 2025-03-21 PROCEDURE — 99283 EMERGENCY DEPT VISIT LOW MDM: CPT

## 2025-03-21 PROCEDURE — 93010 ELECTROCARDIOGRAM REPORT: CPT

## 2025-03-21 RX ORDER — TRAMADOL HYDROCHLORIDE 50 MG/1
TABLET ORAL EVERY 6 HOURS PRN
Qty: 10 TABLET | Refills: 0 | Status: SHIPPED | OUTPATIENT
Start: 2025-03-21 | End: 2025-03-26

## 2025-03-21 RX ORDER — KETOROLAC TROMETHAMINE 30 MG/ML
30 INJECTION, SOLUTION INTRAMUSCULAR; INTRAVENOUS ONCE
Status: COMPLETED | OUTPATIENT
Start: 2025-03-21 | End: 2025-03-21

## 2025-03-21 NOTE — TELEPHONE ENCOUNTER
Attempted to call pt to offer sooner appt with Dr. Alex Behar. No answer, busy dial tone.     PSR: Per Dr. Behar, \"can we try to get Caleb Arthur [birth year] in for an office appointment within the next 3 weeks\"

## 2025-03-21 NOTE — TELEPHONE ENCOUNTER
Patient called and wanted Dr Behar know how he's not doing any better.  He is taking an ambulance to our ED.  He wants an epidural injection  done at the hospital  pain is worsening.   Paul Mcintosh MD is the doctor that will do it for him.  He wants Dr Behar to send the epidural injection order.

## 2025-03-21 NOTE — ED INITIAL ASSESSMENT (HPI)
Pt reports \"I don't want to go through this, this has been going over this for over 30 years\". Pt c/o back pain. Pt denies loss of control of bowel/bladder. Pt c/o tingling to legs. Pt reports x 6 falls since march. Pt reports hitting his head. + loc. Denies being evaluated for fall. Pt denies taking blood thinners. Pt c/o headache, dizziness, and confusion.

## 2025-03-21 NOTE — TELEPHONE ENCOUNTER
Per ER MD's note: \"Discussion of management with other healthcare providers: I spoke with Dr. Behari who states his office will reach out to patient shortly to provide outpatient follow-up in the next week or so.\"    Megan PSR gave patient sooner appointment as directed by Dr. Behar. Nothing additional needed at this time.

## 2025-03-21 NOTE — ED PROVIDER NOTES
Patient Seen in: [facility] Emergency Department      History     Chief Complaint   Patient presents with    Back Pain     Stated Complaint: Back Pain    Subjective:   HPI      Patient is a 69-year-old gentleman who complains of low back pain that is been present for years.  He states it seems to be worse the last few months.  He states that he has intermittent radiation to either right or left buttocks.  No loss of bowel or bladder function.  No weakness.  Pain is worse with walking.  Patient has seen physiatry and has had steroid injections in the past.  He was seen in the ER earlier this month he had a CT scan of his back done.  He states he does not have a follow-up appointment with his physiatrist until May and he cannot wait that long.    Objective:     History reviewed. No pertinent past medical history.           History reviewed. No pertinent surgical history.             Social History     Socioeconomic History    Marital status: Single   Tobacco Use    Smoking status: Every Day    Smokeless tobacco: Never   Vaping Use    Vaping status: Never Used   Substance and Sexual Activity    Alcohol use: Yes     Comment: rarely     Drug use: No     Social Drivers of Health      Received from [facility]                  Physical Exam     ED Triage Vitals [03/21/25 1005]   /64   Pulse 74   Resp 20   Temp 97.9 °F (36.6 °C)   Temp src Temporal   SpO2 96 %   O2 Device None (Room air)       Current Vitals:   Vital Signs  BP: 132/58  Pulse: 61  Resp: 16  Temp: 97.9 °F (36.6 °C)  Temp src: Temporal  MAP (mmHg): 77    Oxygen Therapy  SpO2: 96 %  O2 Device: None (Room air)        Physical Exam  Constitutional: Oriented to person, place, and time. Appears well-developed and well-nourished.   HEENT:   Head: Normocephalic and atraumatic.   Right Ear: External ear normal.   Left Ear: External ear normal.   Nose: Nose normal.   Mouth/Throat: Oropharynx is clear and moist.   Eyes: Conjunctivae and  EOM are normal. Pupils are equal, round, and reactive to light.   Neck: Neck supple.   Cardiovascular: Normal rate, regular rhythm, normal heart sounds and intact distal pulses.    Pulmonary/Chest: Effort normal and breath sounds normal. No respiratory distress.   Abdominal: Soft. Bowel sounds are normal. Exhibits no distension and no mass. There is no tenderness. There is no rebound and no guarding.   Musculoskeletal: Normal range of motion.  Mild lumbar tenderness no straight leg raise pain exhibits no edema or tenderness.   Lymphadenopathy: No cervical adenopathy.   Neurological: Alert and oriented to person, place, and time. Normal reflexes. No cranial nerve deficit. No motor os sensory defecits noted Coordination normal.   Skin: Skin is warm and dry.   Psychiatric: Normal mood and affect. Behavior is normal. Judgment and thought content normal.   Nursing note and vitals reviewed.        ED Course   Labs Reviewed - No data to display  EKG    Rate, intervals and axes as noted on EKG Report.  Rate: 81  Rhythm: Sinus Rhythm  Reading: Sinus rhythm nonspecific T change                       MDM      Use of independent historian:     I personally reviewed and interpreted the images :     No results found.    Vitals:    03/21/25 1005 03/21/25 1030 03/21/25 1100   BP: 122/64 121/59 132/58   Pulse: 74 60 61   Resp: 20 18 16   Temp: 97.9 °F (36.6 °C)     TempSrc: Temporal     SpO2: 96% 96% 96%   Weight: 49.9 kg     Height: 170.2 cm (5' 7\")       *I personally reviewed and interpreted all ED vitals.    Pulse Ox: 96%, Room air, Normal         Differential Diagnosis/ Diagnostic Considerations: Patient with chronic low back pain.  No neurologic deficits on exam.  No fever chills or other red flag symptoms.  Out of his narcotic pain medicine.  Will discuss with his physiatrist.    Medical Record Review: I personally reviewed available prior medical records for any recent pertinent discharge summaries, testing, and procedures  and reviewed those reports and found CT scan of the lumbar spine from 3/10/2025 reviewed.    Complicating Factors: The patient already has chronic back pain which contribute to the complexity of this ED evaluation.    Social determinants of health:    Prescription drug management:      Shared Decision Making:    ED Course: Patient did have some relief of his pain with Toradol here.    Discussion of management with other healthcare providers: I spoke with Dr. Behari who states his office will reach out to patient shortly to provide outpatient follow-up in the next week or so    Condition upon leaving the department: Stable          Medical Decision Making      Disposition and Plan     Clinical Impression:  1. Low back pain, unspecified back pain laterality, unspecified chronicity, unspecified whether sciatica present         Disposition:  Discharge  3/21/2025 11:38 am    Follow-up:  Behar, Alex, MD  [address] [place] [id number]  [phone]    Follow up      We recommend that you schedule follow up care with a primary care provider within the next three months to obtain basic health screening including reassessment of your blood pressure.      Medications Prescribed:  Current Discharge Medication List        START taking these medications    Details   traMADol 50 MG Oral Tab Take 1-2 tablets ( mg total) by mouth every 6 (six) hours as needed for Pain.  Qty: 10 tablet, Refills: 0    Associated Diagnoses: Low back pain, unspecified back pain laterality, unspecified chronicity, unspecified whether sciatica present                 Supplementary Documentation:

## 2025-04-08 ENCOUNTER — APPOINTMENT (OUTPATIENT)
Dept: GENERAL RADIOLOGY | Facility: HOSPITAL | Age: 70
End: 2025-04-08
Attending: EMERGENCY MEDICINE
Payer: MEDICARE

## 2025-04-08 ENCOUNTER — HOSPITAL ENCOUNTER (EMERGENCY)
Facility: HOSPITAL | Age: 70
Discharge: HOME | End: 2025-04-08
Attending: EMERGENCY MEDICINE
Payer: MEDICARE

## 2025-04-08 VITALS
TEMPERATURE: 97 F | OXYGEN SATURATION: 97 % | BODY MASS INDEX: 16.48 KG/M2 | HEIGHT: 67 IN | SYSTOLIC BLOOD PRESSURE: 114 MMHG | WEIGHT: 105 LBS | DIASTOLIC BLOOD PRESSURE: 56 MMHG | HEART RATE: 48 BPM | RESPIRATION RATE: 15 BRPM

## 2025-04-08 DIAGNOSIS — M54.50 LOW BACK PAIN AT MULTIPLE SITES: Primary | ICD-10-CM

## 2025-04-08 PROCEDURE — 99284 EMERGENCY DEPT VISIT MOD MDM: CPT

## 2025-04-08 PROCEDURE — 71045 X-RAY EXAM CHEST 1 VIEW: CPT | Performed by: EMERGENCY MEDICINE

## 2025-04-08 PROCEDURE — 96372 THER/PROPH/DIAG INJ SC/IM: CPT

## 2025-04-08 PROCEDURE — 99283 EMERGENCY DEPT VISIT LOW MDM: CPT

## 2025-04-08 RX ORDER — BENZOCAINE AND DEXTROMETHORPHAN HYDROBROMIDE 7.5; 5 MG/1; MG/1
1 LOZENGE ORAL
Qty: 20 LOZENGE | Refills: 0 | Status: SHIPPED | OUTPATIENT
Start: 2025-04-08 | End: 2025-04-15

## 2025-04-08 RX ORDER — KETOROLAC TROMETHAMINE 30 MG/ML
30 INJECTION, SOLUTION INTRAMUSCULAR; INTRAVENOUS ONCE
Status: COMPLETED | OUTPATIENT
Start: 2025-04-08 | End: 2025-04-08

## 2025-04-08 RX ORDER — HYDROCODONE BITARTRATE AND ACETAMINOPHEN 5; 325 MG/1; MG/1
1 TABLET ORAL EVERY 6 HOURS PRN
Qty: 16 TABLET | Refills: 0 | Status: SHIPPED | OUTPATIENT
Start: 2025-04-08 | End: 2025-04-15

## 2025-04-08 RX ORDER — NAPROXEN 500 MG/1
500 TABLET ORAL 2 TIMES DAILY PRN
Qty: 20 TABLET | Refills: 0 | Status: SHIPPED | OUTPATIENT
Start: 2025-04-08 | End: 2025-04-15

## 2025-04-08 RX ORDER — CYCLOBENZAPRINE HCL 10 MG
10 TABLET ORAL 3 TIMES DAILY PRN
Qty: 20 TABLET | Refills: 0 | Status: SHIPPED | OUTPATIENT
Start: 2025-04-08 | End: 2025-04-15

## 2025-04-08 RX ORDER — HYDROCODONE BITARTRATE AND ACETAMINOPHEN 5; 325 MG/1; MG/1
1 TABLET ORAL ONCE
Status: COMPLETED | OUTPATIENT
Start: 2025-04-08 | End: 2025-04-08

## 2025-04-08 RX ORDER — DIAZEPAM 2 MG/1
2 TABLET ORAL ONCE
Status: COMPLETED | OUTPATIENT
Start: 2025-04-08 | End: 2025-04-08

## 2025-04-08 NOTE — ED PROVIDER NOTES
Patient Seen in: [facility] Emergency Department    History     Chief Complaint   Patient presents with    Back Pain       HPI    69-year-old male presents to the ER for evaluation of low back pain with radiation into the legs.  He states that he has been having worsening discomfort for several months.  He has an appointment with Dr. Behar on the 20th of the month for an epidural injection but states that he does not have any medicine at home to control his discomfort.  Patient denies any bowel or bladder incontinence.  He gets shooting numbness and tingling down the legs typically left greater than right.  Patient also complains of a cough at this time that is mostly nonproductive.  No fevers or chills.    History from Independent Source:       External Records Reviewed: On chart review, patient had CT of his LS spine stated 10 March showing multilevel degenerative changes in the lumbar spine worse since prior MRI in 2019 with most significant findings at L3-L4.  Patient has moderate central canal narrowing and left foraminal narrowing    History reviewed. History reviewed. No pertinent past medical history.    History reviewed. History reviewed. No pertinent surgical history.      Medications :  Prescriptions Prior to Admission[1]     No family history on file.    Smoking Status:   Social History     Socioeconomic History    Marital status: Single   Tobacco Use    Smoking status: Every Day    Smokeless tobacco: Never   Vaping Use    Vaping status: Never Used   Substance and Sexual Activity    Alcohol use: Yes     Comment: rarely     Drug use: No       Constitutional and vital signs reviewed.      Social History and Family History elements reviewed from today, pertinent positives to the presenting problem noted.    Physical Exam     ED Triage Vitals [04/08/25 0843]   /72   Pulse 59   Resp 15   Temp 97 °F (36.1 °C)   Temp src    SpO2 97 %   O2 Device None (Room air)       Physical Exam   Constitutional:  AAOx3, well nourished, NAD  HEENT: Normocephalic, PERRLA, MMM  CV: s1s2+, RRR, no m/r/g, normal distal pulses  Pulmonary/Chest: CTA b/l with no rales, wheezes.  No chest wall tenderness  Abdominal: Nontender.  Nondistended. Soft. Bowel sounds are normal.   Neck/Back: Lumbar paraspinal tenderness from approximately T12-S1.  No midline tenderness percussion  :   Musculoskeletal: Normal range of motion. No deformity.   Neurological: Awake, alert. Normal reflexes. No cranial nerve deficit.    Skin: Skin is warm and dry. No rash noted. No erythema.   Psychiatric:      All measures to prevent infection transmission during my interaction with the patient were taken. The patient was already wearing a droplet mask on my arrival to the room. Personal protective equipment was worn throughout the duration of the exam.      ED Course      Labs Reviewed - No data to display  My Independent Interpretation of EKG (if performed):     Imaging Results Available and Reviewed while in ED: XR CHEST AP PORTABLE  (CPT=71045)    Result Date: 4/8/2025  CONCLUSION:  Negative for radiographically evident acute intrathoracic process.    Dictated by (CST): Luke Harmon MD on 4/08/2025 at 11:40 AM     Finalized by (CST): Luke Harmon MD on 4/08/2025 at 11:41 AM         ED Medications Administered:   Medications   HYDROcodone-acetaminophen (Norco) 5-325 MG per tab 1 tablet (1 tablet Oral Given 4/8/25 0950)   diazePAM (Valium) tab 2 mg (2 mg Oral Given 4/8/25 0950)   ketorolac (Toradol) 30 MG/ML injection 30 mg (30 mg Intramuscular Given 4/8/25 0950)             MDM     Vitals:    04/08/25 0843 04/08/25 0945 04/08/25 1015   BP: 146/72 122/58 (!) 122/95   Pulse: 59 52 (!) 49   Resp: 15 18 15   Temp: 97 °F (36.1 °C)     SpO2: 97% 98% 99%   Weight: 47.6 kg     Height: 170.2 cm (5' 7\")       *I personally reviewed and interpreted all ED vitals.    Independent Interpretation of Studies: I have independently reviewed chest x-ray and there are no  acute findings    Social Determinants of Health:     Procedures:      Differential/MDM/Shared Decision Making: Differential Diagnosis includes spinal stenosis, degenerative disc disease, radiculopathy, others.      The patient already  has no past medical history on file.  to contribute to the complexity of this ED evaluation.           Medications, Diagnostics, or Disposition considered but not done:     Patient has some improvement in symptoms after pain medicine, muscle relaxants, NSAIDs.  Management of case was discussed with patient and he is comfortable discharge on slower medicine for home.  His chest x-ray is unremarkable.  Will prescribe some cough medicine.      Condition upon leaving the department: Stable    Disposition and Plan     Clinical Impression:  1. Low back pain at multiple sites        Disposition:  Discharge    Follow-up:  Behar, Alex, MD  [address] [facility] [address]  [place] [id number]  [phone]    Follow up        Medications Prescribed:  Current Discharge Medication List        START taking these medications    Details   !! naproxen 500 MG Oral Tab Take 1 tablet (500 mg total) by mouth 2 (two) times daily as needed.  Qty: 20 tablet, Refills: 0    Associated Diagnoses: Low back pain at multiple sites      HYDROcodone-acetaminophen 5-325 MG Oral Tab Take 1 tablet by mouth every 6 (six) hours as needed for Pain.  Qty: 16 tablet, Refills: 0    Associated Diagnoses: Low back pain at multiple sites      !! cyclobenzaprine 10 MG Oral Tab Take 1 tablet (10 mg total) by mouth 3 (three) times daily as needed for Muscle spasms.  Qty: 20 tablet, Refills: 0    Associated Diagnoses: Low back pain at multiple sites      Dextromethorphan-Benzocaine (CEPACOL SORE THROAT & COUGH) 5-7.5 MG Mouth/Throat Lozenge Use as directed 1 lozenge in the mouth or throat every 4 to 6 hours for 7 days.  Qty: 20 lozenge, Refills: 0       !! - Potential duplicate medications found. Please discuss with provider.                    [1] (Not in a hospital admission)

## 2025-04-08 NOTE — ED INITIAL ASSESSMENT (HPI)
Pt presents to ED with complaint of ongoing lower back pain. Pt states he had an injury in his 20s, has multiple visits with dr behar for lumbar radiculopathy in 2019. Pt states he has an appointment for an epidural injection with Dr. Behar 4/20 but is not able to tolerate pain until then. Slow gait with c/o leg cramps as well

## 2025-04-23 ENCOUNTER — HOSPITAL ENCOUNTER (EMERGENCY)
Facility: HOSPITAL | Age: 70
Discharge: HOME | End: 2025-04-23
Attending: EMERGENCY MEDICINE
Payer: MEDICARE

## 2025-04-23 VITALS
TEMPERATURE: 98 F | OXYGEN SATURATION: 98 % | BODY MASS INDEX: 17.27 KG/M2 | DIASTOLIC BLOOD PRESSURE: 71 MMHG | RESPIRATION RATE: 18 BRPM | HEIGHT: 67 IN | SYSTOLIC BLOOD PRESSURE: 132 MMHG | WEIGHT: 110 LBS | HEART RATE: 82 BPM

## 2025-04-23 DIAGNOSIS — M54.50 ACUTE ON CHRONIC LOW BACK PAIN: Primary | ICD-10-CM

## 2025-04-23 DIAGNOSIS — G89.29 ACUTE ON CHRONIC LOW BACK PAIN: Primary | ICD-10-CM

## 2025-04-23 PROCEDURE — 99283 EMERGENCY DEPT VISIT LOW MDM: CPT

## 2025-04-23 PROCEDURE — 96372 THER/PROPH/DIAG INJ SC/IM: CPT

## 2025-04-23 PROCEDURE — 99284 EMERGENCY DEPT VISIT MOD MDM: CPT

## 2025-04-23 RX ORDER — KETOROLAC TROMETHAMINE 15 MG/ML
15 INJECTION, SOLUTION INTRAMUSCULAR; INTRAVENOUS ONCE
Status: COMPLETED | OUTPATIENT
Start: 2025-04-23 | End: 2025-04-23

## 2025-04-23 RX ORDER — IBUPROFEN 200 MG
400 TABLET ORAL EVERY 6 HOURS PRN
Qty: 56 TABLET | Refills: 0 | Status: SHIPPED | OUTPATIENT
Start: 2025-04-23 | End: 2025-05-07

## 2025-04-23 RX ORDER — ACETAMINOPHEN 500 MG
1000 TABLET ORAL EVERY 6 HOURS PRN
Qty: 56 TABLET | Refills: 0 | Status: SHIPPED | OUTPATIENT
Start: 2025-04-23 | End: 2025-05-07

## 2025-04-23 RX ORDER — CYCLOBENZAPRINE HCL 10 MG
10 TABLET ORAL 3 TIMES DAILY PRN
Qty: 20 TABLET | Refills: 0 | Status: SHIPPED | OUTPATIENT
Start: 2025-04-23 | End: 2025-04-30

## 2025-04-23 RX ORDER — LIDOCAINE 50 MG/G
1 PATCH TOPICAL EVERY 24 HOURS
Qty: 14 PATCH | Refills: 0 | Status: SHIPPED | OUTPATIENT
Start: 2025-04-23 | End: 2025-05-07

## 2025-04-23 RX ORDER — CYCLOBENZAPRINE HCL 10 MG
10 TABLET ORAL ONCE
Status: COMPLETED | OUTPATIENT
Start: 2025-04-23 | End: 2025-04-23

## 2025-04-23 RX ORDER — ACETAMINOPHEN 500 MG
1000 TABLET ORAL ONCE
Status: COMPLETED | OUTPATIENT
Start: 2025-04-23 | End: 2025-04-23

## 2025-04-23 NOTE — ED PROVIDER NOTES
Patient Seen in: [facility] Emergency Department      History     Chief Complaint   Patient presents with    Back Pain     Stated Complaint: back pain    Subjective:   HPI      History of Present Illness               Objective:     History reviewed. No pertinent past medical history.           History reviewed. No pertinent surgical history.             Social History     Socioeconomic History    Marital status: Single   Tobacco Use    Smoking status: Every Day     Types: Cigarettes    Smokeless tobacco: Never   Vaping Use    Vaping status: Never Used   Substance and Sexual Activity    Alcohol use: Not Currently     Comment: rarely     Drug use: No     Social Drivers of Health      Received from [facility]                                Physical Exam     ED Triage Vitals [04/23/25 1219]   /71   Pulse 82   Resp 18   Temp 98 °F (36.7 °C)   Temp src Temporal   SpO2 98 %   O2 Device None (Room air)       Current Vitals:   Vital Signs  BP: 132/71  Pulse: 82  Resp: 18  Temp: 98 °F (36.7 °C)  Temp src: Temporal    Oxygen Therapy  SpO2: 98 %  O2 Device: None (Room air)        Physical Exam      Physical Exam                ED Course   Labs Reviewed - No data to display       Results                                 MDM      69-year-old male with a history of chronic low back pain for which he has been treated with multiple modalities presents today with acute on chronic pain.  Patient denies any specific inciting injury or strain.  Denies any new symptoms including lower extremity weakness/numbness, fevers, urinary retention, or bowel incontinence.  He states that he has been maintaining himself on naproxen and Flexeril until a follow-up appointment next month with his physiatrist.  He ran out of his medications last week and symptoms have been worsening.    On exam, vitals normal, well-appearing, some lumbar paraspinal tenderness to palpation, strength and sensation intact in the lower  extremities    Differential: Acute on chronic low back pain    Patient given Toradol, Tylenol, Flexeril, and a lidocaine patch in the ED.    He was given prescriptions for an analgesic regimen at home, physiatry follow-up instructions, and return precautions.        MDM    Disposition and Plan     Clinical Impression:  1. Acute on chronic low back pain         Disposition:  Discharge  4/23/2025  2:00 pm    Follow-up:  Behar, Alex, MD  [address] [facility] [address]  [facility] [address]  [phone]    Call      We recommend that you schedule follow up care with a primary care provider within the next three months to obtain basic health screening including reassessment of your blood pressure.      Medications Prescribed:  Discharge Medication List as of 4/23/2025  2:29 PM        START taking these medications    Details   acetaminophen 500 MG Oral Tab Take 2 tablets (1,000 mg total) by mouth every 6 (six) hours as needed for Pain., Normal, Disp-56 tablet, R-0      ibuprofen 200 MG Oral Tab Take 2 tablets (400 mg total) by mouth every 6 (six) hours as needed for Pain., Normal, Disp-56 tablet, R-0      lidocaine 5 % External Patch Place 1 patch onto the skin daily for 14 days., Normal, Disp-14 patch, R-0             Supplementary Documentation:

## 2025-04-23 NOTE — ED INITIAL ASSESSMENT (HPI)
Patient arrives with reports of chronic back pain. Reports taking cyclobenzaprine with some relief. Denies incontinence.

## 2025-05-12 ENCOUNTER — HOSPITAL ENCOUNTER (EMERGENCY)
Facility: HOSPITAL | Age: 70
Discharge: HOME | End: 2025-05-12
Attending: EMERGENCY MEDICINE
Payer: MEDICARE

## 2025-05-12 VITALS
WEIGHT: 110 LBS | SYSTOLIC BLOOD PRESSURE: 141 MMHG | TEMPERATURE: 98 F | OXYGEN SATURATION: 98 % | RESPIRATION RATE: 18 BRPM | HEART RATE: 83 BPM | DIASTOLIC BLOOD PRESSURE: 74 MMHG | HEIGHT: 67 IN | BODY MASS INDEX: 17.27 KG/M2

## 2025-05-12 DIAGNOSIS — M54.41 CHRONIC BILATERAL LOW BACK PAIN WITH BILATERAL SCIATICA: Primary | ICD-10-CM

## 2025-05-12 DIAGNOSIS — G89.29 CHRONIC BILATERAL LOW BACK PAIN WITH BILATERAL SCIATICA: Primary | ICD-10-CM

## 2025-05-12 DIAGNOSIS — M54.42 CHRONIC BILATERAL LOW BACK PAIN WITH BILATERAL SCIATICA: Primary | ICD-10-CM

## 2025-05-12 PROCEDURE — 99283 EMERGENCY DEPT VISIT LOW MDM: CPT

## 2025-05-12 PROCEDURE — 99284 EMERGENCY DEPT VISIT MOD MDM: CPT

## 2025-05-12 RX ORDER — KETOROLAC TROMETHAMINE 10 MG/1
10 TABLET, FILM COATED ORAL EVERY 6 HOURS PRN
Qty: 20 TABLET | Refills: 0 | Status: SHIPPED | OUTPATIENT
Start: 2025-05-12 | End: 2025-05-17

## 2025-05-12 RX ORDER — ORPHENADRINE CITRATE 100 MG/1
100 TABLET ORAL 2 TIMES DAILY
Qty: 20 EACH | Refills: 0 | Status: SHIPPED | OUTPATIENT
Start: 2025-05-12 | End: 2025-05-22

## 2025-05-12 NOTE — ED INITIAL ASSESSMENT (HPI)
Per pt he has problem with his back for a long time, Sciatica.  Per pt he loss his balance and fell today twice.   Pt is A/Ox 4, breathing unlabored.  Per pt he ran out of pain medicines since 04/23/25.

## 2025-05-14 NOTE — ED PROVIDER NOTES
Patient Seen in: [facility] Emergency Department    History     Chief Complaint   Patient presents with    Back Pain       HPI    Patient presents to the ED complaining of chronic low back pain.  He states that he fell twice today due to losing his balance.  He did not injure his back further but states that his back pain has persisted.  He states back pain and radiates into both thighs which is chronic.  He denies any weakness happens to his legs.  No bowel or bladder incontinence.  No fevers or chills.  He ran out of pain medications recently and is requesting refill.  Denies other complaints.    History reviewed. Past Medical History[1]    History reviewed. Past Surgical History[2]      Medications :  Prescriptions Prior to Admission[3]     Family History[4]    Smoking Status: Social Hx on file[5]    Constitutional and vital signs reviewed.      Social History and Family History elements reviewed from today, pertinent positives to the presenting problem noted.    Physical Exam     ED Triage Vitals [05/12/25 1449]   /74   Pulse 83   Resp 20   Temp 98.3 °F (36.8 °C)   Temp src Oral   SpO2 98 %   O2 Device None (Room air)       All measures to prevent infection transmission during my interaction with the patient were taken. Handwashing was performed prior to and after the exam.  Stethoscope and any equipment used during my examination was cleaned with super sani-cloth germicidal wipes following the exam.     Physical Exam  Vitals and nursing note reviewed.   Constitutional:       General: He is not in acute distress.     Appearance: He is well-developed. He is not ill-appearing or toxic-appearing.   HENT:      Head: Normocephalic and atraumatic.   Eyes:      General:         Right eye: No discharge.         Left eye: No discharge.      Conjunctiva/sclera: Conjunctivae normal.   Neck:      Trachea: No tracheal deviation.   Cardiovascular:      Rate and Rhythm: Normal rate.   Pulmonary:      Effort:  Pulmonary effort is normal. No respiratory distress.      Breath sounds: No stridor.   Abdominal:      General: There is no distension.      Palpations: Abdomen is soft.   Musculoskeletal:         General: Tenderness present. No deformity.      Comments: Numbness to bilateral lumbar paraspinal muscles.  No spinal tenderness or deformity.   Skin:     General: Skin is warm and dry.   Neurological:      Mental Status: He is alert and oriented to person, place, and time.   Psychiatric:         Mood and Affect: Mood normal.         Behavior: Behavior normal.         ED Course      Labs Reviewed - No data to display    As Interpreted by me    Imaging Results Available and Reviewed while in ED: No results found.  ED Medications Administered: Medications - No data to display      MDM     Vitals:    05/12/25 1449 05/12/25 1651   BP: 141/74    Pulse: 83    Resp: 20 18   Temp: 98.3 °F (36.8 °C)    TempSrc: Oral    SpO2: 98%    Weight: 49.9 kg    Height: 170.2 cm (5' 7\")      *I personally reviewed and interpreted all ED vitals.    Pulse Ox: 98%, Room air, Normal       Differential Diagnosis/ Diagnostic Considerations: Chronic low back pain, other    Complicating Factors: The patient already has does not have any pertinent problems on file. to contribute to the complexity of this ED evaluation.    Medical Decision Making  Patient presents to the ED with chronic low back pain.  Nondistressed on exam, no evidence of cauda equina.  Patient reassured and stable for discharge with outpatient follow-up.    Problems Addressed:  Chronic bilateral low back pain with bilateral sciatica: acute illness or injury        Condition upon leaving the department: Stable    Disposition and Plan     Clinical Impression:  1. Chronic bilateral low back pain with bilateral sciatica        Disposition:  Discharge    Follow-up:  Behar, Alex, MD  [address] [facility] [address]  [facility] [address]  [phone]    Go in 10 day(s)        Medications  Prescribed:  Discharge Medication List as of 5/12/2025  4:52 PM        START taking these medications    Details   orphenadrine  MG Oral Tablet 12 Hr Take 100 mg by mouth 2 (two) times daily for 10 days., Normal, Disp-20 each, R-0      Ketorolac Tromethamine 10 MG Oral Tab Take 1 tablet (10 mg total) by mouth every 6 (six) hours as needed for Pain., Normal, Disp-20 tablet, R-0                              [1]   Past Medical History:   Sciatica   [2] History reviewed. No pertinent surgical history.  [3] (Not in a hospital admission)  [4] History reviewed. No pertinent family history.  [5]   Social History  Socioeconomic History    Marital status: Single   Tobacco Use    Smoking status: Every Day     Types: Cigarettes    Smokeless tobacco: Never   Vaping Use    Vaping status: Never Used   Substance and Sexual Activity    Alcohol use: Not Currently     Comment: rarely     Drug use: No

## 2025-06-17 ENCOUNTER — HOSPITAL ENCOUNTER (EMERGENCY)
Facility: HOSPITAL | Age: 70
Discharge: HOME | End: 2025-06-17
Attending: EMERGENCY MEDICINE
Payer: MEDICARE

## 2025-06-17 VITALS
DIASTOLIC BLOOD PRESSURE: 60 MMHG | OXYGEN SATURATION: 98 % | SYSTOLIC BLOOD PRESSURE: 132 MMHG | BODY MASS INDEX: 17 KG/M2 | TEMPERATURE: 98 F | RESPIRATION RATE: 18 BRPM | WEIGHT: 110 LBS | HEART RATE: 82 BPM

## 2025-06-17 DIAGNOSIS — M54.41 CHRONIC BILATERAL LOW BACK PAIN WITH BILATERAL SCIATICA: Primary | ICD-10-CM

## 2025-06-17 DIAGNOSIS — M54.42 CHRONIC BILATERAL LOW BACK PAIN WITH BILATERAL SCIATICA: Primary | ICD-10-CM

## 2025-06-17 DIAGNOSIS — G89.29 CHRONIC BILATERAL LOW BACK PAIN WITH BILATERAL SCIATICA: Primary | ICD-10-CM

## 2025-06-17 PROCEDURE — 99284 EMERGENCY DEPT VISIT MOD MDM: CPT

## 2025-06-17 PROCEDURE — 96372 THER/PROPH/DIAG INJ SC/IM: CPT

## 2025-06-17 PROCEDURE — 99283 EMERGENCY DEPT VISIT LOW MDM: CPT

## 2025-06-17 RX ORDER — HYDROCODONE BITARTRATE AND ACETAMINOPHEN 7.5; 325 MG/1; MG/1
1 TABLET ORAL EVERY 6 HOURS PRN
Qty: 20 TABLET | Refills: 0 | Status: SHIPPED | OUTPATIENT
Start: 2025-06-17

## 2025-06-17 RX ORDER — DIAZEPAM 5 MG/1
5 TABLET ORAL ONCE
Status: COMPLETED | OUTPATIENT
Start: 2025-06-17 | End: 2025-06-17

## 2025-06-17 RX ORDER — KETOROLAC TROMETHAMINE 30 MG/ML
30 INJECTION, SOLUTION INTRAMUSCULAR; INTRAVENOUS ONCE
Status: COMPLETED | OUTPATIENT
Start: 2025-06-17 | End: 2025-06-17

## 2025-06-17 RX ORDER — LIDOCAINE 50 MG/G
1 PATCH TOPICAL EVERY 24 HOURS
Qty: 6 PATCH | Refills: 0 | Status: SHIPPED | OUTPATIENT
Start: 2025-06-17 | End: 2025-06-23

## 2025-06-17 RX ORDER — CYCLOBENZAPRINE HCL 10 MG
10 TABLET ORAL 3 TIMES DAILY PRN
Qty: 20 TABLET | Refills: 0 | Status: SHIPPED | OUTPATIENT
Start: 2025-06-17 | End: 2025-07-07

## 2025-06-17 RX ORDER — HYDROCODONE BITARTRATE AND ACETAMINOPHEN 5; 325 MG/1; MG/1
1 TABLET ORAL ONCE
Refills: 0 | Status: COMPLETED | OUTPATIENT
Start: 2025-06-17 | End: 2025-06-17

## 2025-06-17 RX ORDER — METHYLPREDNISOLONE 4 MG/1
TABLET ORAL
Qty: 1 EACH | Refills: 0 | Status: SHIPPED | OUTPATIENT
Start: 2025-06-17

## 2025-06-17 NOTE — ED PROVIDER NOTES
Patient Seen in: [facility] Emergency Department        History  Chief Complaint   Patient presents with    Back Pain     Stated Complaint: Back pain    Subjective:   HPI            69-year-old old male with chronic neck and back problems not able to see his doctor recently after he missed an appointment now here with an exacerbation of his symptoms.  No new trauma or fall.  Symptoms are similar.  He is still ambulatory.  He still works.  No fever.      Objective:     Past Medical History:    Sciatica              History reviewed. No pertinent surgical history.             Social History     Socioeconomic History    Marital status: Single   Tobacco Use    Smoking status: Every Day     Types: Cigarettes    Smokeless tobacco: Never   Vaping Use    Vaping status: Never Used   Substance and Sexual Activity    Alcohol use: Not Currently     Comment: rarely     Drug use: No     Social Drivers of Health      Received from [facility]                                Physical Exam    ED Triage Vitals [06/17/25 1442]   /62   Pulse 91   Resp 18   Temp 98.3 °F (36.8 °C)   Temp src Temporal   SpO2 96 %   O2 Device None (Room air)       Current Vitals:   Vital Signs  BP: 106/62  Pulse: 91  Resp: 18  Temp: 98.3 °F (36.8 °C)  Temp src: Temporal    Oxygen Therapy  SpO2: 96 %  O2 Device: None (Room air)            Physical Exam  Constitutional: Oriented to person, place, and time.  Appears well-developed. No distress.   Head: Normocephalic and atraumatic.   Eyes: Conjunctivae are normal. Pupils are equal, round, and reactive to light.   Cardiovascular: Normal rate, regular rhythm and intact distal pulses.    Musculoskeletal: No gross motor or sensory deficits in the bilateral lower extremities.  Neurological: Alert and oriented to person, place, and time.   Skin: Skin is warm and dry.   Nursing note and vitals reviewed.    Differential diagnosis includes acute exacerbation of chronic low back pain  syndrome, radiculopathy and sciatica.          ED Course  Labs Reviewed - No data to display                         MDM             Medical Decision Making  Patient stable.  Neuro intact grossly.  No indication for imaging.  Will refill his medications to control his pain here.  Patient instructed to follow-up with his physiatrist in a few weeks as planned and come back before with any worsening or change.    Problems Addressed:  Chronic bilateral low back pain with bilateral sciatica: chronic illness or injury with exacerbation, progression, or side effects of treatment    Amount and/or Complexity of Data Reviewed  External Data Reviewed: radiology.     Details: 3/10/25 lumbar CT:  CONCLUSION:      Multilevel lumbar spine degenerative change as detailed above overall progressed since the prior MRI April 2019. The most significant level represents L3-L4 where there is moderate central canal narrowing, moderate left foraminal narrowing and mild right    foraminal narrowing.      Subcentimeter nonobstructing left renal calculus.      Partially imaged borderline circumferential urinary bladder wall thickening.  If there is clinical concern for cystitis, correlation with urinalysis advised.         Risk  OTC drugs.  Prescription drug management.        Disposition and Plan     Clinical Impression:  1. Chronic bilateral low back pain with bilateral sciatica         Disposition:  Discharge  6/17/2025  5:40 pm    Follow-up:  Behar, Alex, MD  [address]  [address]  [place] [id number] [phone]    Call      We recommend that you schedule follow up care with a primary care provider within the next three months to obtain basic health screening including reassessment of your blood pressure.      Medications Prescribed:  Current Discharge Medication List        START taking these medications    Details   HYDROcodone-acetaminophen 7.5-325 MG Oral Tab Take 1 tablet by mouth every 6 (six) hours as needed.  Qty: 20 tablet,  Refills: 0    Associated Diagnoses: Chronic bilateral low back pain with bilateral sciatica      !! methylPREDNISolone 4 MG Oral Tablet Therapy Pack Dosepack: use as directed on packaging  Qty: 1 each, Refills: 0    Associated Diagnoses: Chronic bilateral low back pain with bilateral sciatica       !! - Potential duplicate medications found. Please discuss with provider.                Supplementary Documentation:

## 2025-06-17 NOTE — ED INITIAL ASSESSMENT (HPI)
Pt arrives via wheelchair to ED for c/o lower back pain and sciatica pain radiating down BLE. Aox4, speaking in full sentences.

## 2025-07-29 ENCOUNTER — HOSPITAL ENCOUNTER (OUTPATIENT)
Dept: GENERAL RADIOLOGY | Age: 70
Discharge: HOME | End: 2025-07-29
Attending: PHYSICAL MEDICINE & REHABILITATION

## 2025-07-29 DIAGNOSIS — M47.812 CERVICAL FACET SYNDROME: ICD-10-CM

## 2025-07-29 DIAGNOSIS — M99.9 BIOMECHANICAL LESION: ICD-10-CM

## 2025-07-29 DIAGNOSIS — M79.10 MYALGIA: ICD-10-CM

## 2025-07-29 DIAGNOSIS — M47.816 FACET SYNDROME, LUMBAR: ICD-10-CM

## 2025-07-29 DIAGNOSIS — M48.02 FORAMINAL STENOSIS OF CERVICAL REGION: ICD-10-CM

## 2025-07-29 DIAGNOSIS — M54.2 TRIGGER POINT OF NECK: ICD-10-CM

## 2025-07-29 DIAGNOSIS — M62.838 MUSCLE SPASM: ICD-10-CM

## 2025-07-29 DIAGNOSIS — M50.30 DDD (DEGENERATIVE DISC DISEASE), CERVICAL: ICD-10-CM

## 2025-07-29 DIAGNOSIS — G95.9 CERVICAL MYELOPATHY (HCC): ICD-10-CM

## 2025-07-29 DIAGNOSIS — Z71.6 ENCOUNTER FOR TOBACCO USE CESSATION COUNSELING: ICD-10-CM

## 2025-07-29 PROCEDURE — 72050 X-RAY EXAM NECK SPINE 4/5VWS: CPT | Performed by: PHYSICAL MEDICINE & REHABILITATION

## 2025-07-29 PROCEDURE — 72072 X-RAY EXAM THORAC SPINE 3VWS: CPT | Performed by: PHYSICAL MEDICINE & REHABILITATION

## 2025-07-29 PROCEDURE — 72110 X-RAY EXAM L-2 SPINE 4/>VWS: CPT | Performed by: PHYSICAL MEDICINE & REHABILITATION

## 2025-07-30 ENCOUNTER — APPOINTMENT (OUTPATIENT)
Dept: MRI IMAGING | Facility: HOSPITAL | Age: 70
End: 2025-07-30
Attending: EMERGENCY MEDICINE

## 2025-07-30 ENCOUNTER — TELEPHONE (OUTPATIENT)
Dept: PHYSICAL MEDICINE AND REHAB | Facility: CLINIC | Age: 70
End: 2025-07-30

## 2025-07-30 ENCOUNTER — HOSPITAL ENCOUNTER (EMERGENCY)
Facility: HOSPITAL | Age: 70
Discharge: HOME | End: 2025-07-30
Attending: EMERGENCY MEDICINE

## 2025-07-30 VITALS
WEIGHT: 110 LBS | BODY MASS INDEX: 17.27 KG/M2 | HEIGHT: 67 IN | HEART RATE: 74 BPM | SYSTOLIC BLOOD PRESSURE: 105 MMHG | OXYGEN SATURATION: 97 % | TEMPERATURE: 97 F | RESPIRATION RATE: 19 BRPM | DIASTOLIC BLOOD PRESSURE: 68 MMHG

## 2025-07-30 DIAGNOSIS — M54.9 CHRONIC NECK AND BACK PAIN: ICD-10-CM

## 2025-07-30 DIAGNOSIS — M54.2 CHRONIC NECK AND BACK PAIN: ICD-10-CM

## 2025-07-30 DIAGNOSIS — G89.29 CHRONIC NECK AND BACK PAIN: ICD-10-CM

## 2025-07-30 DIAGNOSIS — G89.29 CHRONIC BACK PAIN, UNSPECIFIED BACK LOCATION, UNSPECIFIED BACK PAIN LATERALITY: Primary | ICD-10-CM

## 2025-07-30 DIAGNOSIS — M54.9 CHRONIC BACK PAIN, UNSPECIFIED BACK LOCATION, UNSPECIFIED BACK PAIN LATERALITY: Primary | ICD-10-CM

## 2025-07-30 LAB
ANION GAP SERPL CALC-SCNC: 0 MMOL/L (ref 0–18)
BASOPHILS # BLD AUTO: 0.03 X10(3) UL (ref 0–0.2)
BASOPHILS NFR BLD AUTO: 0.4 %
BUN BLD-MCNC: 22 MG/DL (ref 9–23)
BUN/CREAT SERPL: 15.7 (ref 10–20)
CALCIUM BLD-MCNC: 8.6 MG/DL (ref 8.7–10.4)
CHLORIDE SERPL-SCNC: 112 MMOL/L (ref 98–112)
CO2 SERPL-SCNC: 25 MMOL/L (ref 21–32)
CREAT BLD-MCNC: 1.4 MG/DL (ref 0.7–1.3)
DEPRECATED RDW RBC AUTO: 55.5 FL (ref 35.1–46.3)
EGFRCR SERPLBLD CKD-EPI 2021: 55 ML/MIN/1.73M2 (ref 60–?)
EOSINOPHIL # BLD AUTO: 0 X10(3) UL (ref 0–0.7)
EOSINOPHIL NFR BLD AUTO: 0 %
ERYTHROCYTE [DISTWIDTH] IN BLOOD BY AUTOMATED COUNT: 14.8 % (ref 11–15)
GLUCOSE BLD-MCNC: 93 MG/DL (ref 70–99)
HCT VFR BLD AUTO: 34.9 % (ref 39–53)
HGB BLD-MCNC: 11.8 G/DL (ref 13–17.5)
IMM GRANULOCYTES # BLD AUTO: 0.02 X10(3) UL (ref 0–1)
IMM GRANULOCYTES NFR BLD: 0.3 %
LYMPHOCYTES # BLD AUTO: 1.71 X10(3) UL (ref 1–4)
LYMPHOCYTES NFR BLD AUTO: 23.1 %
MCH RBC QN AUTO: 34.5 PG (ref 26–34)
MCHC RBC AUTO-ENTMCNC: 33.8 G/DL (ref 31–37)
MCV RBC AUTO: 102 FL (ref 80–100)
MONOCYTES # BLD AUTO: 0.59 X10(3) UL (ref 0.1–1)
MONOCYTES NFR BLD AUTO: 8 %
NEUTROPHILS # BLD AUTO: 5.04 X10 (3) UL (ref 1.5–7.7)
NEUTROPHILS # BLD AUTO: 5.04 X10(3) UL (ref 1.5–7.7)
NEUTROPHILS NFR BLD AUTO: 68.2 %
OSMOLALITY SERPL CALC.SUM OF ELEC: 287 MOSM/KG (ref 275–295)
PLATELET # BLD AUTO: 239 10(3)UL (ref 150–450)
POTASSIUM SERPL-SCNC: 4.4 MMOL/L (ref 3.5–5.1)
RBC # BLD AUTO: 3.42 X10(6)UL (ref 3.8–5.8)
SODIUM SERPL-SCNC: 137 MMOL/L (ref 136–145)
WBC # BLD AUTO: 7.4 X10(3) UL (ref 4–11)

## 2025-07-30 PROCEDURE — 99285 EMERGENCY DEPT VISIT HI MDM: CPT

## 2025-07-30 PROCEDURE — 96376 TX/PRO/DX INJ SAME DRUG ADON: CPT

## 2025-07-30 PROCEDURE — 72146 MRI CHEST SPINE W/O DYE: CPT | Performed by: EMERGENCY MEDICINE

## 2025-07-30 PROCEDURE — 72148 MRI LUMBAR SPINE W/O DYE: CPT | Performed by: EMERGENCY MEDICINE

## 2025-07-30 PROCEDURE — 80048 BASIC METABOLIC PNL TOTAL CA: CPT | Performed by: EMERGENCY MEDICINE

## 2025-07-30 PROCEDURE — 99284 EMERGENCY DEPT VISIT MOD MDM: CPT

## 2025-07-30 PROCEDURE — 72141 MRI NECK SPINE W/O DYE: CPT | Performed by: EMERGENCY MEDICINE

## 2025-07-30 PROCEDURE — 96374 THER/PROPH/DIAG INJ IV PUSH: CPT

## 2025-07-30 PROCEDURE — 85025 COMPLETE CBC W/AUTO DIFF WBC: CPT | Performed by: EMERGENCY MEDICINE

## 2025-07-30 RX ORDER — MORPHINE SULFATE 2 MG/ML
2 INJECTION, SOLUTION INTRAMUSCULAR; INTRAVENOUS ONCE
Status: COMPLETED | OUTPATIENT
Start: 2025-07-30 | End: 2025-07-30

## 2025-07-30 RX ORDER — OXYCODONE AND ACETAMINOPHEN 7.5; 325 MG/1; MG/1
1 TABLET ORAL EVERY 6 HOURS PRN
Qty: 10 TABLET | Refills: 0 | Status: SHIPPED | OUTPATIENT
Start: 2025-07-30 | End: 2025-08-05

## 2025-07-30 RX ORDER — DIAZEPAM 10 MG/2ML
5 INJECTION, SOLUTION INTRAMUSCULAR; INTRAVENOUS ONCE
Status: DISCONTINUED | OUTPATIENT
Start: 2025-07-30 | End: 2025-07-31

## 2025-07-30 RX ORDER — KETOROLAC TROMETHAMINE 15 MG/ML
15 INJECTION, SOLUTION INTRAMUSCULAR; INTRAVENOUS ONCE
Status: DISCONTINUED | OUTPATIENT
Start: 2025-07-30 | End: 2025-07-30

## 2025-07-30 RX ORDER — METHYLPREDNISOLONE 4 MG/1
TABLET ORAL
Qty: 1 EACH | Refills: 0 | Status: SHIPPED | OUTPATIENT
Start: 2025-07-30 | End: 2025-08-05

## 2025-08-05 ENCOUNTER — OFFICE VISIT (OUTPATIENT)
Dept: PHYSICAL MEDICINE AND REHAB | Facility: CLINIC | Age: 70
End: 2025-08-05

## 2025-08-05 VITALS — WEIGHT: 110 LBS | BODY MASS INDEX: 17.27 KG/M2 | HEIGHT: 67 IN

## 2025-08-05 DIAGNOSIS — M47.812 CERVICAL FACET SYNDROME: ICD-10-CM

## 2025-08-05 DIAGNOSIS — G89.29 CHRONIC NECK AND BACK PAIN: ICD-10-CM

## 2025-08-05 DIAGNOSIS — D18.00 CAVERNOMA: Primary | ICD-10-CM

## 2025-08-05 DIAGNOSIS — M47.816 FACET SYNDROME, LUMBAR: ICD-10-CM

## 2025-08-05 DIAGNOSIS — M54.9 CHRONIC BACK PAIN, UNSPECIFIED BACK LOCATION, UNSPECIFIED BACK PAIN LATERALITY: ICD-10-CM

## 2025-08-05 DIAGNOSIS — M48.061 LUMBAR FORAMINAL STENOSIS: ICD-10-CM

## 2025-08-05 DIAGNOSIS — G95.9 CERVICAL MYELOPATHY (HCC): ICD-10-CM

## 2025-08-05 DIAGNOSIS — M79.10 MYALGIA: ICD-10-CM

## 2025-08-05 DIAGNOSIS — M48.02 FORAMINAL STENOSIS OF CERVICAL REGION: ICD-10-CM

## 2025-08-05 DIAGNOSIS — M54.59 MECHANICAL LOW BACK PAIN: ICD-10-CM

## 2025-08-05 DIAGNOSIS — M54.9 CHRONIC NECK AND BACK PAIN: ICD-10-CM

## 2025-08-05 DIAGNOSIS — M54.2 CHRONIC NECK AND BACK PAIN: ICD-10-CM

## 2025-08-05 DIAGNOSIS — M47.816 LUMBAR SPONDYLOSIS: ICD-10-CM

## 2025-08-05 DIAGNOSIS — Z71.6 ENCOUNTER FOR TOBACCO USE CESSATION COUNSELING: ICD-10-CM

## 2025-08-05 DIAGNOSIS — M62.838 MUSCLE SPASM: ICD-10-CM

## 2025-08-05 DIAGNOSIS — M99.9 BIOMECHANICAL LESION: ICD-10-CM

## 2025-08-05 DIAGNOSIS — M51.369 BULGE OF LUMBAR DISC WITHOUT MYELOPATHY: ICD-10-CM

## 2025-08-05 DIAGNOSIS — M54.2 TRIGGER POINT OF NECK: ICD-10-CM

## 2025-08-05 DIAGNOSIS — M50.30 DDD (DEGENERATIVE DISC DISEASE), CERVICAL: ICD-10-CM

## 2025-08-05 DIAGNOSIS — G89.29 CHRONIC BACK PAIN, UNSPECIFIED BACK LOCATION, UNSPECIFIED BACK PAIN LATERALITY: ICD-10-CM

## 2025-08-05 DIAGNOSIS — M54.16 LUMBAR RADICULOPATHY: ICD-10-CM

## 2025-08-05 PROCEDURE — 99214 OFFICE O/P EST MOD 30 MIN: CPT | Performed by: PHYSICAL MEDICINE & REHABILITATION

## 2025-08-05 PROCEDURE — 99499 UNLISTED E&M SERVICE: CPT | Performed by: PHYSICAL MEDICINE & REHABILITATION

## 2025-08-05 RX ORDER — GABAPENTIN 100 MG/1
200 CAPSULE ORAL 3 TIMES DAILY
Qty: 180 CAPSULE | Refills: 0 | Status: SHIPPED | OUTPATIENT
Start: 2025-08-05

## 2025-08-05 RX ORDER — OXYCODONE AND ACETAMINOPHEN 7.5; 325 MG/1; MG/1
1 TABLET ORAL EVERY 8 HOURS PRN
Qty: 60 TABLET | Refills: 0 | Status: SHIPPED | OUTPATIENT
Start: 2025-08-05

## 2025-08-08 ENCOUNTER — APPOINTMENT (OUTPATIENT)
Dept: GENERAL RADIOLOGY | Facility: HOSPITAL | Age: 70
End: 2025-08-08
Attending: EMERGENCY MEDICINE

## 2025-08-08 ENCOUNTER — HOSPITAL ENCOUNTER (EMERGENCY)
Facility: HOSPITAL | Age: 70
Discharge: HOME | End: 2025-08-08
Attending: EMERGENCY MEDICINE

## 2025-08-08 VITALS
BODY MASS INDEX: 17 KG/M2 | OXYGEN SATURATION: 98 % | TEMPERATURE: 98 F | DIASTOLIC BLOOD PRESSURE: 64 MMHG | RESPIRATION RATE: 18 BRPM | WEIGHT: 110 LBS | SYSTOLIC BLOOD PRESSURE: 132 MMHG | HEART RATE: 57 BPM

## 2025-08-08 DIAGNOSIS — G89.29 CHRONIC LOW BACK PAIN, UNSPECIFIED BACK PAIN LATERALITY, UNSPECIFIED WHETHER SCIATICA PRESENT: Primary | ICD-10-CM

## 2025-08-08 DIAGNOSIS — M54.50 CHRONIC LOW BACK PAIN, UNSPECIFIED BACK PAIN LATERALITY, UNSPECIFIED WHETHER SCIATICA PRESENT: Primary | ICD-10-CM

## 2025-08-08 PROCEDURE — 99284 EMERGENCY DEPT VISIT MOD MDM: CPT

## 2025-08-08 PROCEDURE — 99283 EMERGENCY DEPT VISIT LOW MDM: CPT

## 2025-08-08 PROCEDURE — 72100 X-RAY EXAM L-S SPINE 2/3 VWS: CPT | Performed by: EMERGENCY MEDICINE

## 2025-08-08 RX ORDER — HYDROCODONE BITARTRATE AND ACETAMINOPHEN 5; 325 MG/1; MG/1
1 TABLET ORAL EVERY 4 HOURS PRN
Qty: 12 TABLET | Refills: 0 | Status: SHIPPED | OUTPATIENT
Start: 2025-08-08 | End: 2025-08-11

## 2025-08-11 ENCOUNTER — TELEPHONE (OUTPATIENT)
Dept: PHYSICAL MEDICINE AND REHAB | Facility: CLINIC | Age: 70
End: 2025-08-11

## 2025-08-18 DIAGNOSIS — M48.02 FORAMINAL STENOSIS OF CERVICAL REGION: ICD-10-CM

## 2025-08-18 DIAGNOSIS — M47.816 FACET SYNDROME, LUMBAR: ICD-10-CM

## 2025-08-18 DIAGNOSIS — M50.30 DDD (DEGENERATIVE DISC DISEASE), CERVICAL: ICD-10-CM

## 2025-08-18 DIAGNOSIS — M99.9 BIOMECHANICAL LESION: ICD-10-CM

## 2025-08-18 DIAGNOSIS — M47.812 CERVICAL FACET SYNDROME: ICD-10-CM

## 2025-08-18 DIAGNOSIS — Z71.6 ENCOUNTER FOR TOBACCO USE CESSATION COUNSELING: ICD-10-CM

## 2025-08-18 DIAGNOSIS — G95.9 CERVICAL MYELOPATHY (HCC): ICD-10-CM

## 2025-08-18 DIAGNOSIS — M54.2 TRIGGER POINT OF NECK: ICD-10-CM

## 2025-08-18 DIAGNOSIS — M62.838 MUSCLE SPASM: ICD-10-CM

## 2025-08-18 DIAGNOSIS — M79.10 MYALGIA: ICD-10-CM

## 2025-08-21 RX ORDER — CYCLOBENZAPRINE HCL 5 MG
TABLET ORAL
Qty: 90 TABLET | Refills: 0 | Status: SHIPPED | OUTPATIENT
Start: 2025-08-21

## (undated) NOTE — LETTER
8/23/2019      You have been scheduled for surgery on  08/30/29 at [facility]) with Dr.A Jaun Chan. Please plan to arrive 2 hours before your procedure UNLESS instructed to do otherwise.   Your procedure is  Caudal epidural inje taking any of these medications, please contact your medical/heart doctor about stopping these 5-7 days prior to surgery. DO NOT STOP taking any of these medications without your doctors’ instructions.     ? Metformin should be held the day of the procedur

## (undated) NOTE — ED AVS SNAPSHOT
Lulu Mahmood   MRN: [id number]    Department:  [facility] Emergency Department   Date of Visit:  8/19/2019           Disclosure     Insurance plans vary and the physician(s) referred by the ER may not be covered by your plan.  Please contact your CARE PHYSICIAN AT ONCE OR RETURN IMMEDIATELY TO THE EMERGENCY DEPARTMENT. If you have been prescribed any medication(s), please fill your prescription right away and begin taking the medication(s) as directed.   If you believe that any of the medications

## (undated) NOTE — LETTER
19          Johnella Boast  :  1955      To Whom It May Concern: This patient was seen in our office on 19 . Work status:   May return to work full-time with restrictions No Pushing, Pulling, Lifting, and or Carrying anything over 40 lb

## (undated) NOTE — LETTER
Date & Time: 3/21/2025, 11:47 AM  Patient: Caleb Arthur  Encounter Provider(s):    Shivam Marina MD       To Whom It May Concern:    Caleb Arthur was seen and treated in our department on 3/21/2025. He can return to work.    If you have any questions or concerns, please do not hesitate to call.        _____________________________  Physician/APC Signature

## (undated) NOTE — LETTER
Date & Time: 3/10/2025, 2:30 PM  Patient: Caleb Arthur  Encounter Provider(s):    Vicente Hagen APRN       To Whom It May Concern:    Caleb Arthur was seen and treated in our department on 3/10/2025. He should not return to work until 03/17/2025 .    If you have any questions or concerns, please do not hesitate to call.        _____________________________  Physician/APC Signature

## (undated) NOTE — LETTER
[facility] SURGERY SCHEDULING FORM   [address] [facility]   [phone] (scheduling phone) [phone] (scheduling fax)     PATIENT INFORMATION   Last Name:      Geoffrey Espinosa      First Name:    Dalia Davila Allergies: Patient has no known allergies.          Completed by:    Saintclair Rye      Date:    8/1/2019

## (undated) NOTE — LETTER
2/20/2019          To Whom It May Concern:    Allen Mathias is currently under my medical care. Activity is restricted as follows: .no lifting greater than 40 lbs. If you require additional information please contact our office.         Sincerely,    Bettye Sharpe

## (undated) NOTE — LETTER
Date & Time: 8/19/2019, 9:20 AM  Patient: Krishan Romeo  Encounter Provider(s):    Opal Lara MD       To Whom It May Concern:    Nadeen Rossi was seen and treated in our department on 8/19/2019. He should not return to work until 8/21/19.     I

## (undated) NOTE — LETTER
19          Kathy Stager  :  1955      To Whom It May Concern: This patient was seen in our office on 19 . Work status:  No Pushing, Pulling, Lifting, and or Carrying anything over 10 lbs, and no climbing ladders.          If this off

## (undated) NOTE — LETTER
19          Buddy Hemphill  :  1955      To Whom It May Concern: This patient was seen in our office on 19 . Work status:   May return to work full-time with restrictions No Pushing, Pulling, Lifting, and or Carrying anything over 40 lb